# Patient Record
Sex: MALE | Race: WHITE | Employment: OTHER | URBAN - METROPOLITAN AREA
[De-identification: names, ages, dates, MRNs, and addresses within clinical notes are randomized per-mention and may not be internally consistent; named-entity substitution may affect disease eponyms.]

---

## 2017-04-21 PROBLEM — Z79.01 LONG TERM (CURRENT) USE OF ANTICOAGULANTS: Status: ACTIVE | Noted: 2017-04-21

## 2017-05-22 PROCEDURE — 86141 C-REACTIVE PROTEIN HS: CPT | Performed by: INTERNAL MEDICINE

## 2017-05-22 PROCEDURE — 82043 UR ALBUMIN QUANTITATIVE: CPT | Performed by: INTERNAL MEDICINE

## 2017-05-22 PROCEDURE — 81003 URINALYSIS AUTO W/O SCOPE: CPT | Performed by: INTERNAL MEDICINE

## 2017-05-22 PROCEDURE — 82570 ASSAY OF URINE CREATININE: CPT | Performed by: INTERNAL MEDICINE

## 2017-07-21 PROBLEM — E04.2 MULTIPLE THYROID NODULES: Status: ACTIVE | Noted: 2017-07-21

## 2017-08-25 ENCOUNTER — LAB ENCOUNTER (OUTPATIENT)
Dept: LAB | Age: 67
End: 2017-08-25
Attending: INTERNAL MEDICINE
Payer: MEDICARE

## 2017-08-25 DIAGNOSIS — E04.2 MULTIPLE THYROID NODULES: ICD-10-CM

## 2017-08-25 PROCEDURE — 88173 CYTOPATH EVAL FNA REPORT: CPT

## 2017-08-30 NOTE — PROGRESS NOTES
[phone] ([place])     [facility] regarding Dr. Magan Torres result note. Hours and number given.

## 2017-08-30 NOTE — PROGRESS NOTES
I would let him know that I received his FNA results. His L sided nodule was benign and R sided nodule was nondiagnostic. I feel comfortable continuing to monitor him with these results.   The R sided nodule which was nondiagnostic was previously biopsied

## 2018-05-03 VITALS — HEIGHT: 72 IN | BODY MASS INDEX: 27.77 KG/M2 | WEIGHT: 205 LBS

## 2018-05-04 NOTE — PAT NURSING NOTE
Labs from today reviewed with Dr. Jarvis Garcia; Plan is to have patient admitted night before for IV hydration;  Spoke with Sonia LI @ [facility] office who will coordinate with Dr. Jarvis Garcia and notify patient. Charge RN in cath lab notified.

## 2018-05-06 ENCOUNTER — HOSPITAL ENCOUNTER (OUTPATIENT)
Facility: HOSPITAL | Age: 68
Discharge: HOME | End: 2018-05-08
Attending: INTERNAL MEDICINE | Admitting: INTERNAL MEDICINE
Payer: MEDICARE

## 2018-05-06 PROBLEM — I25.10 CAD (CORONARY ARTERY DISEASE): Status: ACTIVE | Noted: 2018-05-06

## 2018-05-06 PROCEDURE — 85610 PROTHROMBIN TIME: CPT | Performed by: INTERNAL MEDICINE

## 2018-05-06 PROCEDURE — 83735 ASSAY OF MAGNESIUM: CPT | Performed by: HOSPITALIST

## 2018-05-06 PROCEDURE — 80048 BASIC METABOLIC PNL TOTAL CA: CPT | Performed by: HOSPITALIST

## 2018-05-06 PROCEDURE — 96372 THER/PROPH/DIAG INJ SC/IM: CPT

## 2018-05-06 PROCEDURE — 82962 GLUCOSE BLOOD TEST: CPT

## 2018-05-06 RX ORDER — SODIUM CHLORIDE 9 MG/ML
INJECTION, SOLUTION INTRAVENOUS CONTINUOUS
Status: DISCONTINUED | OUTPATIENT
Start: 2018-05-06 | End: 2018-05-08

## 2018-05-06 RX ORDER — ONDANSETRON 2 MG/ML
4 INJECTION INTRAMUSCULAR; INTRAVENOUS EVERY 6 HOURS PRN
Status: DISCONTINUED | OUTPATIENT
Start: 2018-05-06 | End: 2018-05-08

## 2018-05-06 RX ORDER — SODIUM PHOSPHATE, DIBASIC AND SODIUM PHOSPHATE, MONOBASIC 7; 19 G/133ML; G/133ML
1 ENEMA RECTAL ONCE AS NEEDED
Status: DISCONTINUED | OUTPATIENT
Start: 2018-05-06 | End: 2018-05-08

## 2018-05-06 RX ORDER — SODIUM POLYSTYRENE SULFONATE 15 G/60ML
15 SUSPENSION ORAL; RECTAL ONCE
Status: COMPLETED | OUTPATIENT
Start: 2018-05-06 | End: 2018-05-06

## 2018-05-06 RX ORDER — DEXTROSE MONOHYDRATE 25 G/50ML
50 INJECTION, SOLUTION INTRAVENOUS
Status: DISCONTINUED | OUTPATIENT
Start: 2018-05-06 | End: 2018-05-08

## 2018-05-06 RX ORDER — METOCLOPRAMIDE HYDROCHLORIDE 5 MG/ML
10 INJECTION INTRAMUSCULAR; INTRAVENOUS EVERY 8 HOURS PRN
Status: DISCONTINUED | OUTPATIENT
Start: 2018-05-06 | End: 2018-05-08

## 2018-05-06 RX ORDER — RISPERIDONE 0.25 MG/1
0.5 TABLET, FILM COATED ORAL DAILY
Status: DISCONTINUED | OUTPATIENT
Start: 2018-05-07 | End: 2018-05-08

## 2018-05-06 RX ORDER — BISACODYL 10 MG
10 SUPPOSITORY, RECTAL RECTAL
Status: DISCONTINUED | OUTPATIENT
Start: 2018-05-06 | End: 2018-05-08

## 2018-05-06 RX ORDER — ATORVASTATIN CALCIUM 20 MG/1
20 TABLET, FILM COATED ORAL NIGHTLY
Status: DISCONTINUED | OUTPATIENT
Start: 2018-05-06 | End: 2018-05-08

## 2018-05-06 RX ORDER — ACETAMINOPHEN 325 MG/1
650 TABLET ORAL EVERY 6 HOURS PRN
Status: DISCONTINUED | OUTPATIENT
Start: 2018-05-06 | End: 2018-05-08

## 2018-05-06 RX ORDER — ESCITALOPRAM OXALATE 20 MG/1
20 TABLET ORAL DAILY
Status: DISCONTINUED | OUTPATIENT
Start: 2018-05-06 | End: 2018-05-08

## 2018-05-06 RX ORDER — SODIUM CHLORIDE 9 MG/ML
INJECTION, SOLUTION INTRAVENOUS CONTINUOUS
Status: DISCONTINUED | OUTPATIENT
Start: 2018-05-06 | End: 2018-05-07

## 2018-05-06 RX ORDER — CLONAZEPAM 1 MG/1
1 TABLET ORAL NIGHTLY
Status: DISCONTINUED | OUTPATIENT
Start: 2018-05-06 | End: 2018-05-08

## 2018-05-06 RX ORDER — POLYETHYLENE GLYCOL 3350 17 G/17G
17 POWDER, FOR SOLUTION ORAL DAILY PRN
Status: DISCONTINUED | OUTPATIENT
Start: 2018-05-06 | End: 2018-05-08

## 2018-05-06 RX ORDER — RISPERIDONE 0.25 MG/1
0.25 TABLET, FILM COATED ORAL EVERY EVENING
Status: DISCONTINUED | OUTPATIENT
Start: 2018-05-06 | End: 2018-05-08

## 2018-05-07 ENCOUNTER — HOSPITAL ENCOUNTER (OUTPATIENT)
Dept: INTERVENTIONAL RADIOLOGY/VASCULAR | Facility: HOSPITAL | Age: 68
Discharge: HOME | End: 2018-05-07
Attending: INTERNAL MEDICINE
Payer: MEDICARE

## 2018-05-07 ENCOUNTER — APPOINTMENT (OUTPATIENT)
Dept: INTERVENTIONAL RADIOLOGY/VASCULAR | Facility: HOSPITAL | Age: 68
End: 2018-05-07
Attending: INTERNAL MEDICINE
Payer: MEDICARE

## 2018-05-07 PROCEDURE — 93005 ELECTROCARDIOGRAM TRACING: CPT

## 2018-05-07 PROCEDURE — 93455 CORONARY ART/GRFT ANGIO S&I: CPT

## 2018-05-07 PROCEDURE — 027035Z DILATION OF CORONARY ARTERY, ONE ARTERY WITH TWO DRUG-ELUTING INTRALUMINAL DEVICES, PERCUTANEOUS APPROACH: ICD-10-PCS | Performed by: INTERNAL MEDICINE

## 2018-05-07 PROCEDURE — 82962 GLUCOSE BLOOD TEST: CPT

## 2018-05-07 PROCEDURE — 96372 THER/PROPH/DIAG INJ SC/IM: CPT

## 2018-05-07 PROCEDURE — 80048 BASIC METABOLIC PNL TOTAL CA: CPT | Performed by: HOSPITALIST

## 2018-05-07 PROCEDURE — 85025 COMPLETE CBC W/AUTO DIFF WBC: CPT | Performed by: HOSPITALIST

## 2018-05-07 PROCEDURE — B211YZZ FLUOROSCOPY OF MULTIPLE CORONARY ARTERIES USING OTHER CONTRAST: ICD-10-PCS | Performed by: INTERNAL MEDICINE

## 2018-05-07 PROCEDURE — 85610 PROTHROMBIN TIME: CPT | Performed by: INTERNAL MEDICINE

## 2018-05-07 PROCEDURE — 83735 ASSAY OF MAGNESIUM: CPT | Performed by: HOSPITALIST

## 2018-05-07 PROCEDURE — B213YZZ FLUOROSCOPY OF MULTIPLE CORONARY ARTERY BYPASS GRAFTS USING OTHER CONTRAST: ICD-10-PCS | Performed by: INTERNAL MEDICINE

## 2018-05-07 PROCEDURE — 85018 HEMOGLOBIN: CPT | Performed by: NURSE PRACTITIONER

## 2018-05-07 PROCEDURE — 93010 ELECTROCARDIOGRAM REPORT: CPT | Performed by: INTERNAL MEDICINE

## 2018-05-07 PROCEDURE — B218YZZ FLUOROSCOPY OF LEFT INTERNAL MAMMARY BYPASS GRAFT USING OTHER CONTRAST: ICD-10-PCS | Performed by: INTERNAL MEDICINE

## 2018-05-07 RX ORDER — LABETALOL HYDROCHLORIDE 5 MG/ML
INJECTION, SOLUTION INTRAVENOUS
Status: COMPLETED
Start: 2018-05-07 | End: 2018-05-07

## 2018-05-07 RX ORDER — CLOPIDOGREL BISULFATE 75 MG/1
TABLET ORAL
Status: COMPLETED
Start: 2018-05-07 | End: 2018-05-07

## 2018-05-07 RX ORDER — HEPARIN SODIUM 5000 [USP'U]/ML
INJECTION, SOLUTION INTRAVENOUS; SUBCUTANEOUS
Status: COMPLETED
Start: 2018-05-07 | End: 2018-05-07

## 2018-05-07 RX ORDER — ACYCLOVIR 400 MG/1
800 TABLET ORAL
Status: DISCONTINUED | OUTPATIENT
Start: 2018-05-07 | End: 2018-05-08

## 2018-05-07 RX ORDER — SODIUM CHLORIDE 9 MG/ML
INJECTION, SOLUTION INTRAVENOUS CONTINUOUS
Status: ACTIVE | OUTPATIENT
Start: 2018-05-07 | End: 2018-05-07

## 2018-05-07 RX ORDER — CLOPIDOGREL BISULFATE 75 MG/1
75 TABLET ORAL DAILY
Status: DISCONTINUED | OUTPATIENT
Start: 2018-05-08 | End: 2018-05-08

## 2018-05-07 RX ORDER — ASPIRIN 81 MG/1
81 TABLET, CHEWABLE ORAL DAILY
Status: DISCONTINUED | OUTPATIENT
Start: 2018-05-07 | End: 2018-05-08

## 2018-05-07 RX ORDER — MIDAZOLAM HYDROCHLORIDE 1 MG/ML
INJECTION INTRAMUSCULAR; INTRAVENOUS
Status: DISCONTINUED
Start: 2018-05-07 | End: 2018-05-07 | Stop reason: WASHOUT

## 2018-05-07 RX ORDER — CLOPIDOGREL BISULFATE 75 MG/1
75 TABLET ORAL DAILY
Status: DISCONTINUED | OUTPATIENT
Start: 2018-05-08 | End: 2018-05-07

## 2018-05-07 RX ORDER — LIDOCAINE HYDROCHLORIDE 10 MG/ML
INJECTION, SOLUTION EPIDURAL; INFILTRATION; INTRACAUDAL; PERINEURAL
Status: COMPLETED
Start: 2018-05-07 | End: 2018-05-07

## 2018-05-07 NOTE — H&P
[facility] Hospitalist H&P       CC: direct admission for IVF prior to angiogram    PCP: Valentino Coons, MD    History of Present Illness: Pt is a 78 yo with mmp including but not limited to cad s/p cabg, s/p bioprosthetic aortic valve replacement, HTN/ klonopin  Gabapentin              OTHER (SEE COMMENTS)    Comment:weakness  Haldol [Haloperidol]    JITTERY    Comment:Made movements worse  Adhesive Tape           RASH  Latex                   RASH     Home Medications:    Outpatient Prescriptions Heart Disorder Father    • Cancer Mother      pancreatic cancer   • Heart Disorder Brother        Review of Systems  Comprehensive ROS reviewed and negative except for what's stated above. Including negative for chest pain, shortness of breath, syncope. prior to angiogram  -NPO at midnight    **CAD, HTN/HL  -coumadin on hold    **DMII-  -half dose daily insulin for tomorrow given NPO status  -iss, accuchecks    CKD-stable, monitor    **hyperkalemia- arb d/c'ed per cards.  Kayexalate x1    **cerebrovascular

## 2018-05-07 NOTE — PROCEDURES
[facility]    PATIENT'S NAME: Mp Finnegan   ATTENDING PHYSICIAN: Pepper Lawler. Marco Vizcarra M.D. OPERATING PHYSICIAN: Sherri Baeza M.D.    PATIENT ACCOUNT#:   [de-identified]    LOCATION:  [facility] 3 EDWP 10  MEDICAL RECORD #:   [id number]       D

## 2018-05-07 NOTE — PROGRESS NOTES
Pt with s/p cardiac cath with intervention. Right fem cath. Having post procedure complaint right flank pain. Site soft, no increased tenderness noted with palpation. No hematoma or firmness noted. Right fem cath site soft no hematoma. BP stable.  H/H ord

## 2018-05-07 NOTE — DIETARY NOTE
Nutrition Short Note    Dietitian consult received per cardiac rehab/CHF protocol. Pt to be educated by cardiac rehab staff and encouraged to attend outpatient classes taught by TERRY. TERRY available PRN.     Bonnie Brower RD, LDN  Pager #[phone]

## 2018-05-07 NOTE — PLAN OF CARE
Diabetes/Glucose Control    • Glucose maintained within prescribed range Progressing        Patient/Family Goals    • Patient/Family Long Term Goal Progressing    • Patient/Family Short Term Goal Progressing          Neuro: AOx4    Resp: Clear. Room air.  I

## 2018-05-07 NOTE — PROGRESS NOTES
[facility] Hospitalist Progress Note     PCP: Mp Rivera MD    CC:  Follow up    SUBJECTIVE:  Pt laying in bed. s/p PCI today with Casimiro placed to prox and midRCA. No complaints.  No cp/sob/n/v/f/c    OBJECTIVE:  Temp:  [97.8 °F (36.6 °C)-99.5 °F (37.5 °C)] 1 mg Oral Nightly   • escitalopram  20 mg Oral Daily   • risperiDONE  0.25 mg Oral QPM   • insulin detemir  17 Units Subcutaneous Daily   • Insulin Aspart Pen  2-10 Units Subcutaneous TID CC and HS   • risperiDONE  0.5 mg Oral Daily     • sodium chloride

## 2018-05-07 NOTE — PROCEDURES
[facility] Cardiology      Procedure:   GRANT MID AND PROX RCA, ANGIOMAX,                          PERCLOSE RFA          RESULTS: PTCA/STENT PROX, MID RCA, 95% TO 0% WITH 3 X 24 AND 3.5 X 24 MM SYNERGY DE STENTS, SLIGHTLY OVERLAPPING, EXPANDED TO 3.5 MM AND 4.5 M

## 2018-05-07 NOTE — PROCEDURES
Procedure:  [facility]  CHUCHO  SVG angio  LIMA Angio    Findings:  1. Occluded proximal LAD  2.  30% prox RI  3.  99% mid RCA stenosis followed by 70% stenosis  4. Patent SVG -Diag  5. Patent LIMA to LAD  6.   Diffuse disease in native LAD beyond anastomosis of

## 2018-05-07 NOTE — CONSULTS
[facility] Cardiology  Report of Consultation    Stef Trujillo Patient Status:  Observation    1950 MRN [id number]   [facility] 2NE-A Attending Wesley Ramos MD   Hosp Day # 0 PCP Harrison Salinas MD     Reason for KNEE REPLACEMENT SURGERY      Comment: right  2008: KNEE REPLACEMENT SURGERY      Comment: left  No date: OTHER SURGICAL HISTORY      Comment: right shoulder aa/lysis of adhesions  11/27/12: OTHER SURGICAL HISTORY      Comment: left  gisella. debride rct and l Inject 35 Units into the skin nightly.  (Patient taking differently: Inject 35 Units into the skin every morning.  ) Disp: 13.5 mL Rfl: 1   MetFORMIN HCl  MG Oral Tablet 24 Hr Take 1 tablet twice daily Disp: 180 tablet Rfl: 1   allopurinol 300 MG Oral male.  Pt is in no acute distress. HEENT:   Normocephalic. Atraumatic. Eyes with no scleral icterus. Neck: Supple. No JVD. Carotids 2+ and equal in symmetric fashion. No bruits are noted. Cardiac: Regular rate and rhythm.    There is a normal S1 and appearing LV function. 3.  Hypertension - controlled. BP significantly improved with weight reduction. 4.  Hyperlipidemia. 5.  Diabetes mellitus. 6.  Questionable family history of premature vascular disease.     7.  Transient post-op AFib - no documen

## 2018-05-07 NOTE — PLAN OF CARE
Diabetes/Glucose Control    • Glucose maintained within prescribed range Progressing        Patient/Family Goals    • Patient/Family Long Term Goal Progressing    • Patient/Family Short Term Goal Progressing          Assumed care of patient at 1.  Alert

## 2018-05-08 VITALS
OXYGEN SATURATION: 100 % | DIASTOLIC BLOOD PRESSURE: 66 MMHG | RESPIRATION RATE: 18 BRPM | TEMPERATURE: 98 F | HEART RATE: 73 BPM | SYSTOLIC BLOOD PRESSURE: 161 MMHG

## 2018-05-08 PROCEDURE — 85025 COMPLETE CBC W/AUTO DIFF WBC: CPT | Performed by: INTERNAL MEDICINE

## 2018-05-08 PROCEDURE — 83735 ASSAY OF MAGNESIUM: CPT | Performed by: HOSPITALIST

## 2018-05-08 PROCEDURE — 80048 BASIC METABOLIC PNL TOTAL CA: CPT | Performed by: INTERNAL MEDICINE

## 2018-05-08 PROCEDURE — 99211 OFF/OP EST MAY X REQ PHY/QHP: CPT

## 2018-05-08 PROCEDURE — 82962 GLUCOSE BLOOD TEST: CPT

## 2018-05-08 RX ORDER — CLOPIDOGREL BISULFATE 75 MG/1
75 TABLET ORAL DAILY
Qty: 90 TABLET | Refills: 3 | Status: SHIPPED | OUTPATIENT
Start: 2018-05-09 | End: 2019-04-02

## 2018-05-08 NOTE — PROGRESS NOTES
[facility] Cardiology  Progress Note    Harle Danger Patient Status:  Outpatient in a Bed    1950 MRN [id number]   [facility] 2NE-A Attending Toney Peri Day # 0 PCP MD Padilla Morgan Comment:Intolerance, made movements worse.  Tolerates             klonopin  Gabapentin              OTHER (SEE COMMENTS)    Comment:weakness  Haldol [Haloperidol]    JITTERY    Comment:Made movements worse  Adhesive Tape           RASH  Latex 05/06/18 2023 05/07/18   0550   PTP  15.4*  14.9*   INR  1.17*  1.12*       No results for input(s): TROP, CK in the last 72 hours. Tele: SR    Assessment:    1.  Exertional angina   -S/P GRANT to RCA 5/7/18  2.   S/P Bioprosthetic Aortic valve replac

## 2018-05-08 NOTE — PLAN OF CARE
Diabetes/Glucose Control    • Glucose maintained within prescribed range Adequate for Discharge        Patient/Family Goals    • Patient/Family Long Term Goal Adequate for Discharge    • Patient/Family Short Term Goal Adequate for Discharge            Pt a

## 2018-05-08 NOTE — PROGRESS NOTES
[facility] Hospitalist Progress Note     PCP: Lisbet Hartley MD    CC:  Follow up    SUBJECTIVE:  Pt laying in bed. No complaints. No cp/sob/n/v/f/c.  Hopes to go home today    OBJECTIVE:  Temp:  [98 °F (36.7 °C)-98.4 °F (36.9 °C)] 98.2 °F (36.8 °C)  Pul Daily   • acyclovir  800 mg Oral 5 x daily   • atorvastatin  20 mg Oral Nightly   • ClonazePAM  1 mg Oral Nightly   • escitalopram  20 mg Oral Daily   • risperiDONE  0.25 mg Oral QPM   • insulin detemir  17 Units Subcutaneous Daily   • Insulin Aspart Pen

## 2018-05-08 NOTE — PLAN OF CARE
Alert. Oriented. sr per tele. Rt groin soft, no hematoma noted. Denies pain. Voided. poc discussed with patient. Cont. to monitor pt.

## 2018-05-08 NOTE — DISCHARGE SUMMARY
General Medicine Discharge Summary     Patient ID:  Princess Chino  79year old  [id number]  8/23/1950    Admit date: 5/6/2018    Discharge date and time: 5/8/2018  4:47 PM     Attending Physician: Niels Spurling MD    Primary Care Physician: Emilia CALVERT 5/3/2018  DATE OF SERVICE: 05.03.2018 EXAMINATION: Chest radiograph 2 views CLINICAL INFORMATION: Essential (primary) hypertension. COMPARISON STUDY: September 28, 2017 FINDINGS: Cardiac silhouette and mediastinal contours are within normal limits.  [facility] Historical    clonazepam (KLONOPIN) 1 MG Oral Tab  Take 1 mg by mouth nightly.  , Historical      STOP taking these medications    insulin detemir 100 UNIT/ML Subcutaneous Solution Pen-injector    Warfarin Sodium 7.5 MG Oral Tab          Home Medication Ch

## 2018-05-08 NOTE — PLAN OF CARE
NURSING DISCHARGE NOTE    Discharged home via private  Accompanied by wife  Belongings taken with      Pt discharged to home. Discharge instructions reviewed with pt and wife, verbalized understand. IV catheter removed intact.  Pt tolerated well

## 2018-05-11 NOTE — PROCEDURES
[facility]    PATIENT'S NAME: Michael Irving   ATTENDING PHYSICIAN: Colt Infante. Luly Dee MD   OPERATING PHYSICIAN: Kody Soler M.D.    PATIENT ACCOUNT#:   [de-identified]    LOCATION:  [address] [place] [address]  MEDICAL RECORD #:   [id number]       DATE OF BIRTH: disengaged from the ostium of the right coronary artery and engaged a saphenous venous graft to the diagonal artery distribution. This graft is patent throughout its course.   There is mild diffuse luminal narrowing throughout the entire native diagonal ar of Dr. Robert Hightower.      Dictated By Walt Castillo M.D.  d: 05/10/2018 01:10:56  t: 05/10/2018 13:50:14  [facility] [id number]  /

## 2018-06-01 PROBLEM — Z79.01 LONG TERM (CURRENT) USE OF ANTICOAGULANTS: Status: RESOLVED | Noted: 2017-04-21 | Resolved: 2018-05-17

## 2018-07-24 PROCEDURE — 82607 VITAMIN B-12: CPT | Performed by: INTERNAL MEDICINE

## 2018-10-16 PROCEDURE — 82570 ASSAY OF URINE CREATININE: CPT | Performed by: PHYSICIAN ASSISTANT

## 2018-10-16 PROCEDURE — 82043 UR ALBUMIN QUANTITATIVE: CPT | Performed by: PHYSICIAN ASSISTANT

## 2019-03-21 RX ORDER — RISPERIDONE 0.25 MG/1
0.25 TABLET, FILM COATED ORAL NIGHTLY
COMMUNITY
End: 2019-03-22

## 2019-03-22 VITALS — BODY MASS INDEX: 27.09 KG/M2 | WEIGHT: 200 LBS | HEIGHT: 72 IN

## 2019-03-26 ENCOUNTER — HOSPITAL ENCOUNTER (OUTPATIENT)
Dept: INTERVENTIONAL RADIOLOGY/VASCULAR | Facility: HOSPITAL | Age: 69
Discharge: HOME | End: 2019-03-26
Attending: INTERNAL MEDICINE | Admitting: INTERNAL MEDICINE
Payer: MEDICARE

## 2019-03-26 DIAGNOSIS — Z95.818 STATUS POST PLACEMENT OF IMPLANTABLE LOOP RECORDER: ICD-10-CM

## 2019-03-26 PROCEDURE — 33286 RMVL SUBQ CAR RHYTHM MNTR: CPT

## 2019-03-26 PROCEDURE — 0JPT02Z REMOVAL OF MONITORING DEVICE FROM TRUNK SUBCUTANEOUS TISSUE AND FASCIA, OPEN APPROACH: ICD-10-PCS | Performed by: INTERNAL MEDICINE

## 2019-03-26 RX ORDER — LIDOCAINE HYDROCHLORIDE AND EPINEPHRINE 10; 10 MG/ML; UG/ML
10 INJECTION, SOLUTION INFILTRATION; PERINEURAL ONCE
Status: COMPLETED | OUTPATIENT
Start: 2019-03-26 | End: 2019-03-26

## 2019-03-26 RX ADMIN — LIDOCAINE HYDROCHLORIDE AND EPINEPHRINE 10 ML: 10; 10 INJECTION, SOLUTION INFILTRATION; PERINEURAL at 09:45:00

## 2019-03-26 NOTE — PROGRESS NOTES
linq reveal removed. manual pressure held per MD. Narvaez Lilly placed per dr Ramos. Site wnl  1135: discharge  Instructions reviewed with spouse. no questions. pt discharged to home ambulatory

## 2019-04-08 NOTE — PROCEDURES
Loop Recorder Removal      History:  76year old male with a cryptogenic stroke s/p LINQ now at [facility] who presents for its removal. The risks and benefits of the procedure (including, but not limited to, hematoma and infection) were discussed.  The patient un

## 2019-04-08 NOTE — H&P
Pre-op Diagnosis: * No pre-op diagnosis entered *    The above referenced H&P was reviewed by Keith Cuadra MD on 4/8/2019, the patient was examined and no significant changes have occurred in the patient's condition since the H&P was performed.   I d

## 2020-07-21 PROBLEM — E87.20 METABOLIC ACIDOSIS: Status: ACTIVE | Noted: 2020-07-21

## 2020-07-21 PROBLEM — E87.2 METABOLIC ACIDOSIS: Status: ACTIVE | Noted: 2020-07-21

## 2020-07-21 PROBLEM — E11.21 DIABETIC NEPHROPATHY ASSOCIATED WITH TYPE 2 DIABETES MELLITUS (HCC): Status: ACTIVE | Noted: 2020-07-21

## 2020-07-21 PROBLEM — N20.0 KIDNEY STONES: Status: ACTIVE | Noted: 2020-07-21

## 2020-10-13 ENCOUNTER — APPOINTMENT (OUTPATIENT)
Dept: MRI IMAGING | Facility: HOSPITAL | Age: 70
End: 2020-10-13
Attending: EMERGENCY MEDICINE
Payer: MEDICARE

## 2020-10-13 ENCOUNTER — HOSPITAL ENCOUNTER (OUTPATIENT)
Facility: HOSPITAL | Age: 70
Setting detail: OBSERVATION
Discharge: HOME | End: 2020-10-15
Attending: EMERGENCY MEDICINE | Admitting: INTERNAL MEDICINE
Payer: MEDICARE

## 2020-10-13 ENCOUNTER — APPOINTMENT (OUTPATIENT)
Dept: CT IMAGING | Facility: HOSPITAL | Age: 70
End: 2020-10-13
Attending: EMERGENCY MEDICINE
Payer: MEDICARE

## 2020-10-13 DIAGNOSIS — R41.0 ACUTE CONFUSION: Primary | ICD-10-CM

## 2020-10-13 PROBLEM — R41.3 AMNESIA MEMORY LOSS: Status: ACTIVE | Noted: 2020-10-13

## 2020-10-13 PROCEDURE — 96372 THER/PROPH/DIAG INJ SC/IM: CPT

## 2020-10-13 PROCEDURE — 85025 COMPLETE CBC W/AUTO DIFF WBC: CPT | Performed by: EMERGENCY MEDICINE

## 2020-10-13 PROCEDURE — 70450 CT HEAD/BRAIN W/O DYE: CPT | Performed by: EMERGENCY MEDICINE

## 2020-10-13 PROCEDURE — 80053 COMPREHEN METABOLIC PANEL: CPT | Performed by: EMERGENCY MEDICINE

## 2020-10-13 PROCEDURE — 99285 EMERGENCY DEPT VISIT HI MDM: CPT

## 2020-10-13 PROCEDURE — 96374 THER/PROPH/DIAG INJ IV PUSH: CPT

## 2020-10-13 PROCEDURE — 80053 COMPREHEN METABOLIC PANEL: CPT

## 2020-10-13 PROCEDURE — 81003 URINALYSIS AUTO W/O SCOPE: CPT | Performed by: EMERGENCY MEDICINE

## 2020-10-13 PROCEDURE — 82962 GLUCOSE BLOOD TEST: CPT

## 2020-10-13 PROCEDURE — 93005 ELECTROCARDIOGRAM TRACING: CPT

## 2020-10-13 PROCEDURE — 85025 COMPLETE CBC W/AUTO DIFF WBC: CPT

## 2020-10-13 PROCEDURE — 70551 MRI BRAIN STEM W/O DYE: CPT | Performed by: EMERGENCY MEDICINE

## 2020-10-13 PROCEDURE — 93010 ELECTROCARDIOGRAM REPORT: CPT

## 2020-10-13 PROCEDURE — 36415 COLL VENOUS BLD VENIPUNCTURE: CPT

## 2020-10-13 RX ORDER — LOSARTAN POTASSIUM 25 MG/1
12.5 TABLET ORAL DAILY
Status: DISCONTINUED | OUTPATIENT
Start: 2020-10-14 | End: 2020-10-15

## 2020-10-13 RX ORDER — CLOPIDOGREL BISULFATE 75 MG/1
75 TABLET ORAL
Status: DISCONTINUED | OUTPATIENT
Start: 2020-10-14 | End: 2020-10-15

## 2020-10-13 RX ORDER — ALBUTEROL SULFATE 2.5 MG/3ML
2.5 SOLUTION RESPIRATORY (INHALATION) EVERY 6 HOURS PRN
Status: DISCONTINUED | OUTPATIENT
Start: 2020-10-13 | End: 2020-10-15

## 2020-10-13 RX ORDER — ALBUTEROL SULFATE 90 UG/1
2 AEROSOL, METERED RESPIRATORY (INHALATION) EVERY 4 HOURS PRN
Status: DISCONTINUED | OUTPATIENT
Start: 2020-10-13 | End: 2020-10-15

## 2020-10-13 RX ORDER — ALLOPURINOL 300 MG/1
300 TABLET ORAL DAILY
Status: DISCONTINUED | OUTPATIENT
Start: 2020-10-14 | End: 2020-10-15

## 2020-10-13 RX ORDER — SODIUM BICARBONATE 325 MG/1
650 TABLET ORAL 2 TIMES DAILY
Status: DISCONTINUED | OUTPATIENT
Start: 2020-10-13 | End: 2020-10-15

## 2020-10-13 RX ORDER — ONDANSETRON 2 MG/ML
4 INJECTION INTRAMUSCULAR; INTRAVENOUS EVERY 6 HOURS PRN
Status: DISCONTINUED | OUTPATIENT
Start: 2020-10-13 | End: 2020-10-15

## 2020-10-13 RX ORDER — RISPERIDONE 0.25 MG/1
0.5 TABLET, FILM COATED ORAL 2 TIMES DAILY
Status: DISCONTINUED | OUTPATIENT
Start: 2020-10-13 | End: 2020-10-15

## 2020-10-13 RX ORDER — HEPARIN SODIUM 5000 [USP'U]/ML
5000 INJECTION, SOLUTION INTRAVENOUS; SUBCUTANEOUS EVERY 8 HOURS SCHEDULED
Status: DISCONTINUED | OUTPATIENT
Start: 2020-10-13 | End: 2020-10-15

## 2020-10-13 RX ORDER — METOCLOPRAMIDE HYDROCHLORIDE 5 MG/ML
5 INJECTION INTRAMUSCULAR; INTRAVENOUS EVERY 8 HOURS PRN
Status: DISCONTINUED | OUTPATIENT
Start: 2020-10-13 | End: 2020-10-15

## 2020-10-13 RX ORDER — LABETALOL HYDROCHLORIDE 5 MG/ML
10 INJECTION, SOLUTION INTRAVENOUS EVERY 6 HOURS PRN
Status: DISCONTINUED | OUTPATIENT
Start: 2020-10-13 | End: 2020-10-15

## 2020-10-13 RX ORDER — ESCITALOPRAM OXALATE 20 MG/1
20 TABLET ORAL DAILY
Status: DISCONTINUED | OUTPATIENT
Start: 2020-10-14 | End: 2020-10-15

## 2020-10-13 RX ORDER — DEXTROSE MONOHYDRATE 25 G/50ML
50 INJECTION, SOLUTION INTRAVENOUS
Status: DISCONTINUED | OUTPATIENT
Start: 2020-10-13 | End: 2020-10-15

## 2020-10-13 RX ORDER — ACETAMINOPHEN 325 MG/1
650 TABLET ORAL EVERY 6 HOURS PRN
Status: DISCONTINUED | OUTPATIENT
Start: 2020-10-13 | End: 2020-10-15

## 2020-10-13 RX ORDER — CLONAZEPAM 0.5 MG/1
1 TABLET ORAL NIGHTLY
Status: DISCONTINUED | OUTPATIENT
Start: 2020-10-13 | End: 2020-10-15

## 2020-10-13 RX ORDER — CETIRIZINE HYDROCHLORIDE 10 MG/1
10 TABLET ORAL
Status: DISCONTINUED | OUTPATIENT
Start: 2020-10-13 | End: 2020-10-15

## 2020-10-13 RX ORDER — ASPIRIN 81 MG/1
81 TABLET, CHEWABLE ORAL DAILY
Status: DISCONTINUED | OUTPATIENT
Start: 2020-10-14 | End: 2020-10-15

## 2020-10-13 RX ORDER — ATORVASTATIN CALCIUM 20 MG/1
20 TABLET, FILM COATED ORAL NIGHTLY
Status: DISCONTINUED | OUTPATIENT
Start: 2020-10-13 | End: 2020-10-15

## 2020-10-13 RX ORDER — MELATONIN
3 NIGHTLY
Status: DISCONTINUED | OUTPATIENT
Start: 2020-10-13 | End: 2020-10-15

## 2020-10-13 NOTE — ED INITIAL ASSESSMENT (HPI)
Pt to ED with family with concerns for episode of memory loss. Per wife pt drove self to store around 1pm, when she returned home at 3pm pt was still gone. She went to store to meet pt and feels he was confused and repetitive with his tasks.  Pt reports he

## 2020-10-14 ENCOUNTER — APPOINTMENT (OUTPATIENT)
Dept: CT IMAGING | Facility: HOSPITAL | Age: 70
End: 2020-10-14
Attending: Other
Payer: MEDICARE

## 2020-10-14 PROCEDURE — 95816 EEG AWAKE AND DROWSY: CPT

## 2020-10-14 PROCEDURE — 96372 THER/PROPH/DIAG INJ SC/IM: CPT

## 2020-10-14 PROCEDURE — 70498 CT ANGIOGRAPHY NECK: CPT | Performed by: OTHER

## 2020-10-14 PROCEDURE — 82962 GLUCOSE BLOOD TEST: CPT

## 2020-10-14 PROCEDURE — 80048 BASIC METABOLIC PNL TOTAL CA: CPT | Performed by: INTERNAL MEDICINE

## 2020-10-14 PROCEDURE — 70496 CT ANGIOGRAPHY HEAD: CPT | Performed by: OTHER

## 2020-10-14 PROCEDURE — 84443 ASSAY THYROID STIM HORMONE: CPT | Performed by: INTERNAL MEDICINE

## 2020-10-14 PROCEDURE — 80061 LIPID PANEL: CPT | Performed by: INTERNAL MEDICINE

## 2020-10-14 NOTE — PLAN OF CARE
Received patient a/Ox4, CANDELARIA, NSR on tele at 0700  Neuro q shift, no deficits   EEG completed, see results  CTA completed, results pending   Denying pain, reporting tingling to bilateral feet  Wife at bedside during the day     Problem: PAIN - ADULT  Goal: V minimize risk of hemorrhage  - Monitor temperature, glucose, and sodium.  Initiate appropriate interventions as ordered  Outcome: Progressing     Problem: NEUROLOGICAL - ADULT  Goal: Achieves stable or improved neurological status  Description: INTERVENTION

## 2020-10-14 NOTE — ED PROVIDER NOTES
Patient Seen in: [facility] Emergency Department      History   Patient presents with:  Altered Mental Status    Stated Complaint: Confused ~ wife reports patient was in grocery store for three hours and does n*    HPI    Patient is a 25-year-old ma 12/15/2011 - MARIA ELENA Moore    hemorrhoids, repeat 2021.    • COLONOSCOPY  12-15-11   • COLONOSCOPY, POSSIBLE BIOPSY, POSSIBLE POLYPECTOMY 88803 N/A 12/15/2011    Performed by Trell Villalta MD at [address] [place] [address]    right difficulty. No neglect. No gaze  No visual field deficit. Strength is 5 out of 5 both upper extremities he does have some movement issues which are chronic in the left upper extremity.   There is no lower extremity drift  Sensation is normal in the lo Report. Rate: 66  Rhythm: Sinus Rhythm  Reading: Sinus rhythm without acute ST changes. CBC BMP reviewed. Blood work is at his baseline. Urine is clean.     I have personally visualized the Radiology studies and agree with interpretation fro Patient here with acute short-term memory issues. Discussed with Dr. Troy Ruelas, [facility] neurology on-call. Agrees with MRI and admission. Will see the patient consultation.     Patient will be admitted primarily to the [facility] hospitalist.    Care has been dis

## 2020-10-14 NOTE — CONSULTS
Neurology consult NOTE    The reason for consultation:    Transient altered awareness.      HPI:   Mr. Babita Pichardo is a 80 yo male with PMH of 2 V CABG (2011) and bioprosthetic AVR, CAD s/p PCI (2018), hx of prior CVA, hx of hemiballism, HTN, HLD, DM type II, d rct and labrum with manip   • VALVE REPLACEMENT  1/25/11    23mm Cresencio-Clark Magna Ease Bovine      Family History   Problem Relation Age of Onset   • Heart Disorder Father    • Cancer Mother         pancreatic cancer   • Heart Disorder Brother RASH  Latex                   RASH  No current facility-administered medications on file prior to encounter.      •  Insulin Lispro, 1 Unit Dial, (HUMALOG KWIKPEN) 100 UNIT/ML Subcutaneous Solution Pen-injector, USE UP TO 30 UNITS THREE TIMES DAILY WITH CLARISSE Pen-injector, Inject 38 Units into the skin nightly., Disp: 13.5 mL, Rfl: 1    •  aspirin 81 MG Oral Tab, Take 81 mg by mouth daily. , Disp: , Rfl:     •  risperiDONE (RISPERDAL) 0.5 MG Oral Tab, Take 0.5 mg by mouth 2 (two) times daily.   , Disp: , Rfl: CONSTITUTIONAL/CV     Appearance: well nourished, well developed, no acute distress. MENTAL STATUS:     Orientation: Orientated to person, place and time.      Memory: Intacted       CRANIAL NERVES:     Visual fields /VA : There was no VF deficit on Cerebral atrophy with small vessel changes. Areas of encephalomalacia within the right parietal and left frontal lobe which are new since prior examination but likely represent chronic findings.   If clinical concern for acute infarct is high, consider MRI

## 2020-10-14 NOTE — H&P
[facility] Hospitalist H&P       CC: Patient presents with:  Altered Mental Status       PCP: Himanshu Guerin MD    History of Present Illness:  Mr. Shahid Saldaña is a 80 yo male with PMH of 2 V CABG (2011) and bioprosthetic AVR, CAD s/p PCI (2018), hx of prior CV 11/27/12    left sh aa. debride rct and labrum with manip   • VALVE REPLACEMENT  1/25/11    23mm Cresencio-Clark Magna Ease Bovine         ALL:    Ativan [Lorazepam]      JITTERY    Comment:Intolerance, made movements worse.  Tolerates             klonopi strip, Rfl: 3    •  Clopidogrel Bisulfate 75 MG Oral Tab, Take 1 tablet (75 mg total) by mouth once daily. , Disp: 90 tablet, Rfl: 3    •  Insulin Lispro (HUMALOG KWIKPEN) 100 UNIT/ML Subcutaneous Solution Pen-injector, USE UP TO 30 UNITS THREE TIMES DAILY anicteric, oral mucosa moist  Pulm: Lungs clear bilaterally, good inspiratory effort   CV:  nL S1/S2, RRR  Abd: soft, NT/ND, no hepatomegaly, +BS  MSK: moving all extremities, no edema  Neuro: moving all extremities, CN intact  Skin: no rashes/lesions  Psy evidence of depressed skull fracture. CONCLUSION: Cerebral atrophy with small vessel changes. Areas of encephalomalacia within the right parietal and left frontal lobe which are new since prior examination but likely represent chronic findings. (CST): Dez Yo MD on 10/13/2020 at 10:06 PM              ASSESSMENT / PLAN:      Mr. Babita Pichardo is a 80 yo male with PMH of 2 V CABG (2011) and bioprosthetic AVR, CAD s/p PCI (2018), hx of prior CVA, hx of hemiballism, HTN, HLD, DM type II, depressio

## 2020-10-15 VITALS
OXYGEN SATURATION: 96 % | DIASTOLIC BLOOD PRESSURE: 71 MMHG | HEIGHT: 72.84 IN | HEART RATE: 67 BPM | RESPIRATION RATE: 15 BRPM | SYSTOLIC BLOOD PRESSURE: 139 MMHG | WEIGHT: 210 LBS | TEMPERATURE: 98 F | BODY MASS INDEX: 27.83 KG/M2

## 2020-10-15 PROCEDURE — 82962 GLUCOSE BLOOD TEST: CPT

## 2020-10-15 PROCEDURE — 96372 THER/PROPH/DIAG INJ SC/IM: CPT

## 2020-10-15 NOTE — PLAN OF CARE
Pt discharged per wc per transport. No c/o and in no apparent distress. Reviewed in detail with wfie and pt the AVS. Pt is not to drive for 6 months and both understand and state will follow.

## 2020-10-15 NOTE — PLAN OF CARE
A/o x4, NSR on tele. WNL on RA. Denies any pain or SOB at this time. Baseline tingling to feet. Plan of care discussed with pt, verbalized understanding.  Call light within reach       Problem: PAIN - ADULT  Goal: Verbalizes/displays adequate comfort level temperature, glucose, and sodium.  Initiate appropriate interventions as ordered  Outcome: Progressing  Goal: Absence of seizures  Description: INTERVENTIONS  - Monitor for seizure activity  - Administer anti-seizure medications as ordered  - Monitor neurol

## 2020-10-15 NOTE — PLAN OF CARE
Assumed care of pt at [address] with wife at the bedside. Both voicing disappointment that the pt is not going home tonight. Neuro check done and intact, pt is alert and oriented. No periods of forgetfulness noted today per wife. Iv sl. sr on tele.  Sat 94% on r

## 2020-10-15 NOTE — PROCEDURES
[address]      PATIENT'S NAME: Abdulkadir Pineda   ATTENDING PHYSICIAN: Marisa Renteria MD   PATIENT ACCOUNT #: [de-identified] LOCATION: [address] [place] [address]   MEDICAL RECORD #: [id number] DATE OF BIRTH:

## 2020-10-15 NOTE — CONSULTS
Geo Solorzano MD.   [facility]  Vascular and Endovascular Surgery     VASCULAR SURGERY   CONSULT NOTE      Name: Stef Trujillo   :   1950  [id number]     10/15/2020       REFERRING PHYSICIAN: Henry Bonner DO  PRIMARY CARE PHYSI CABG X 2 with LIMA to LAD, SVG to diag   • CABG     • COLONOSCOPY  12/15/2011 - MARIA ELENA Moore    hemorrhoids, repeat 2021.    • COLONOSCOPY  12-15-11   • COLONOSCOPY, POSSIBLE BIOPSY, POSSIBLE POLYPECTOMY 50147 N/A 12/15/2011    Performed by Tre Wilson MD at •  cetirizine (ZYRTEC) tab 10 mg, 10 mg, Oral, Daily PRN  •  clonazePAM (KLONOPIN) tab 1 mg, 1 mg, Oral, Nightly  •  Clopidogrel Bisulfate (PLAVIX) tab 75 mg, 75 mg, Oral, Daily  •  melatonin tab 3 mg, 3 mg, Oral, Nightly  •  risperiDONE (RISPERDAL) tab 0. Recent Labs   Lab 10/13/20  1809   WBC 10.9   HGB 12.4*   MCV 94.4   .0       Recent Labs   Lab 10/13/20  1809 10/14/20  0618    141   K 4.6 4.4    109   CO2 24.0 28.0   BUN 40* 40*   CREATSERUM 2.03* 1.86*   * 109*   CA 8.7 8.3* PROCEDURE:  CT BRAIN OR HEAD (47737)  COMPARISON:  None. INDICATIONS:  Confused ~ wife reports patient was in grocery store for three hours and does not remember the event.  Wife also reports patient's unsteadiness was also worsenin  TECHNIQUE:  Noncontras PROCEDURE:  MRI BRAIN (CPT=70551)  COMPARISON:  None. INDICATIONS:  Confused ~ wife reports patient was in grocery store for three hours and does not remember the event.  Wife also reports patient's unsteadiness was also worsenin  TECHNIQUE:  MRI of the br PROCEDURE:  CTA BRAIN+CTA CAROTIDS (CONTRAST ONLY) (CPT=70496/34786)  COMPARISON:  [facility] , CT, CT BRAIN OR HEAD (35780), 10/13/2020, 7:21 PM.  INDICATIONS:  Confusion.   TECHNIQUE:  CT angiography of the head and neck were obtained with non-ionic contrast. cerebral arteries are patent. Mild atherosclerotic irregularity is noted within the anterior middle cerebral arteries. The branches of the posterior cerebral and superior  cerebellar arteries are patent.   There is mild to moderate atherosclerotic irreg CONCLUSION:  1. No acute intracranial hemorrhage. 2. There are small subacute or chronic cortical infarcts seen within the anterior left frontal lobe, posterior left frontal lobe, and right parietal lobe. These are similar in extent since 10/13/2020.   The The patient indicated an understanding of these issues and agreed to the plan and all questions were answered. Thank you for allowing me to participate in the patient's care.      Sincerely,  Cyrus Likes MD  10/15/20   1:56 PM

## 2020-10-15 NOTE — PROGRESS NOTES
[facility] Hospitalist Team  Progress Note      Heydi Arroyo  8/23/1950    Assessment/Plan:         Mr. Alessandro Still is a 80 yo male with PMH of 2 V CABG (2011) and bioprosthetic AVR, CAD s/p PCI (2018), hx of prior CVA, hx of hemiballism, HTN, HLD, DM type II, dep rate and rhythm, no murmur  Abd: Abdomen soft, nontender, nondistended, bowel sounds present  MSK: No edema, no clubbing, no cyanosis  Skin: no rashes or lesions      Data:       Labs:   Recent Labs   Lab 10/13/20  1809   WBC 10.9   HGB 12.4*   MCV 94.4

## 2020-10-15 NOTE — DISCHARGE SUMMARY
General Medicine Discharge Summary     Patient ID:  Mehnaz Bledsoe  79year old  8/23/1950    Admit date: 10/13/2020    Discharge date and time:10/15/20    Attending Physician: Suma Diamond DO type II c/b neuropathy and nephropathy  - continue insulin -- levemir 25 units nightly  - SSC     # CKD stage III  - follows with nephrology  - on low dose ARB given albuminuria  - also on sodium bicarb  - avoid nephrotoxic agents  - Cr at baseline     # M 100 UNIT/ML Subcutaneous Solution Pen-injector  USE UP TO 30 UNITS THREE TIMES DAILY WITH MEALS    insulin glargine (TOUJEO SOLOSTAR) 300 UNIT/ML Subcutaneous Solution Pen-injector  Inject 38 Units into the skin nightly.     aspirin 81 MG Oral Tab  Take 81

## 2020-10-16 NOTE — PROGRESS NOTES
Neurology follow up NOTE    SUBJECTIVE:  No overnight events. Have talked to his wife at bed side today. Pt dose has intermittent confusion, but she contributed to his multi- meds and depression.  She also reported later that pt was dehydrated at the [facility]    SPEECH:     Articulation: NL     Rhythm: NL     REFLEXES:     RUE: 2+/4     RLE: absent      LUE: 2+/4     LLE: absent     Ct Brain Or Head (59642)    Result Date: 10/13/2020  CONCLUSION: Cerebral atrophy with small vessel changes.   Areas of encephalo nodule. Thyroid ultrasound is recommended for further assessment. 6. Possible dental caries. Clinical correlation with dental exam is recommended.     Dictated by (CST): Claudette Phillips MD on 10/14/2020 at 7:30 PM     Finalized by (CST): Claudette Phillips

## 2020-11-14 ENCOUNTER — APPOINTMENT (OUTPATIENT)
Dept: LAB | Age: 70
DRG: 036 | End: 2020-11-14
Attending: SURGERY
Payer: MEDICARE

## 2020-11-14 ENCOUNTER — LAB ENCOUNTER (OUTPATIENT)
Dept: LAB | Age: 70
DRG: 036 | End: 2020-11-14
Attending: SURGERY
Payer: MEDICARE

## 2020-11-14 DIAGNOSIS — I77.9 BILATERAL CAROTID ARTERY DISEASE (HCC): ICD-10-CM

## 2020-11-14 DIAGNOSIS — Z01.818 PREOP TESTING: ICD-10-CM

## 2020-11-14 DIAGNOSIS — Z91.89 AT RISK FOR BLEEDING: ICD-10-CM

## 2020-11-14 PROCEDURE — 85025 COMPLETE CBC W/AUTO DIFF WBC: CPT

## 2020-11-14 PROCEDURE — 86900 BLOOD TYPING SEROLOGIC ABO: CPT

## 2020-11-14 PROCEDURE — 86850 RBC ANTIBODY SCREEN: CPT

## 2020-11-14 PROCEDURE — 86901 BLOOD TYPING SEROLOGIC RH(D): CPT

## 2020-11-14 PROCEDURE — 36415 COLL VENOUS BLD VENIPUNCTURE: CPT

## 2020-11-14 PROCEDURE — 80048 BASIC METABOLIC PNL TOTAL CA: CPT

## 2020-11-14 PROCEDURE — 85730 THROMBOPLASTIN TIME PARTIAL: CPT

## 2020-11-14 PROCEDURE — 85610 PROTHROMBIN TIME: CPT

## 2020-11-15 ENCOUNTER — ANESTHESIA EVENT (OUTPATIENT)
Dept: CARDIAC SURGERY | Facility: HOSPITAL | Age: 70
DRG: 036 | End: 2020-11-15
Payer: MEDICARE

## 2020-11-16 ENCOUNTER — HOSPITAL ENCOUNTER (INPATIENT)
Facility: HOSPITAL | Age: 70
LOS: 2 days | Discharge: HOME | DRG: 036 | End: 2020-11-18
Attending: SURGERY | Admitting: SURGERY
Payer: MEDICARE

## 2020-11-16 ENCOUNTER — ANESTHESIA (OUTPATIENT)
Dept: CARDIAC SURGERY | Facility: HOSPITAL | Age: 70
DRG: 036 | End: 2020-11-16
Payer: MEDICARE

## 2020-11-16 ENCOUNTER — APPOINTMENT (OUTPATIENT)
Dept: GENERAL RADIOLOGY | Facility: HOSPITAL | Age: 70
DRG: 036 | End: 2020-11-16
Attending: SURGERY
Payer: MEDICARE

## 2020-11-16 DIAGNOSIS — Z01.818 PREOP TESTING: ICD-10-CM

## 2020-11-16 DIAGNOSIS — Z91.89 AT RISK FOR BLEEDING: ICD-10-CM

## 2020-11-16 DIAGNOSIS — I77.9 BILATERAL CAROTID ARTERY DISEASE (HCC): Primary | ICD-10-CM

## 2020-11-16 PROCEDURE — 82803 BLOOD GASES ANY COMBINATION: CPT

## 2020-11-16 PROCEDURE — 82962 GLUCOSE BLOOD TEST: CPT

## 2020-11-16 PROCEDURE — B317YZZ FLUOROSCOPY OF LEFT INTERNAL CAROTID ARTERY USING OTHER CONTRAST: ICD-10-PCS | Performed by: SURGERY

## 2020-11-16 PROCEDURE — 84295 ASSAY OF SERUM SODIUM: CPT

## 2020-11-16 PROCEDURE — 86920 COMPATIBILITY TEST SPIN: CPT

## 2020-11-16 PROCEDURE — 82330 ASSAY OF CALCIUM: CPT

## 2020-11-16 PROCEDURE — 037L3DZ DILATION OF LEFT INTERNAL CAROTID ARTERY WITH INTRALUMINAL DEVICE, PERCUTANEOUS APPROACH: ICD-10-PCS | Performed by: SURGERY

## 2020-11-16 PROCEDURE — 84132 ASSAY OF SERUM POTASSIUM: CPT

## 2020-11-16 PROCEDURE — 85347 COAGULATION TIME ACTIVATED: CPT

## 2020-11-16 PROCEDURE — 71045 X-RAY EXAM CHEST 1 VIEW: CPT | Performed by: SURGERY

## 2020-11-16 PROCEDURE — 85014 HEMATOCRIT: CPT

## 2020-11-16 DEVICE — 9 MM X 40 MM
Type: IMPLANTABLE DEVICE | Site: NECK | Status: FUNCTIONAL
Brand: ENROUTE TRANSCAROTID STENT

## 2020-11-16 RX ORDER — CEFAZOLIN SODIUM/WATER 2 G/20 ML
SYRINGE (ML) INTRAVENOUS AS NEEDED
Status: DISCONTINUED | OUTPATIENT
Start: 2020-11-16 | End: 2020-11-16 | Stop reason: SURG

## 2020-11-16 RX ORDER — LABETALOL HYDROCHLORIDE 5 MG/ML
5 INJECTION, SOLUTION INTRAVENOUS EVERY 5 MIN PRN
Status: ACTIVE | OUTPATIENT
Start: 2020-11-16 | End: 2020-11-17

## 2020-11-16 RX ORDER — MIDAZOLAM HYDROCHLORIDE 1 MG/ML
INJECTION INTRAMUSCULAR; INTRAVENOUS AS NEEDED
Status: DISCONTINUED | OUTPATIENT
Start: 2020-11-16 | End: 2020-11-16 | Stop reason: SURG

## 2020-11-16 RX ORDER — DEXTROSE AND SODIUM CHLORIDE 5; .45 G/100ML; G/100ML
INJECTION, SOLUTION INTRAVENOUS CONTINUOUS
Status: DISCONTINUED | OUTPATIENT
Start: 2020-11-16 | End: 2020-11-18

## 2020-11-16 RX ORDER — HYDROCODONE BITARTRATE AND ACETAMINOPHEN 5; 325 MG/1; MG/1
2 TABLET ORAL AS NEEDED
Status: COMPLETED | OUTPATIENT
Start: 2020-11-16 | End: 2020-11-16

## 2020-11-16 RX ORDER — ATORVASTATIN CALCIUM 20 MG/1
20 TABLET, FILM COATED ORAL NIGHTLY
Status: DISCONTINUED | OUTPATIENT
Start: 2020-11-16 | End: 2020-11-18

## 2020-11-16 RX ORDER — DEXMEDETOMIDINE HYDROCHLORIDE 4 UG/ML
INJECTION, SOLUTION INTRAVENOUS CONTINUOUS PRN
Status: DISCONTINUED | OUTPATIENT
Start: 2020-11-16 | End: 2020-11-16 | Stop reason: SURG

## 2020-11-16 RX ORDER — NALOXONE HYDROCHLORIDE 0.4 MG/ML
80 INJECTION, SOLUTION INTRAMUSCULAR; INTRAVENOUS; SUBCUTANEOUS AS NEEDED
Status: ACTIVE | OUTPATIENT
Start: 2020-11-16 | End: 2020-11-17

## 2020-11-16 RX ORDER — HYDROMORPHONE HYDROCHLORIDE 1 MG/ML
0.4 INJECTION, SOLUTION INTRAMUSCULAR; INTRAVENOUS; SUBCUTANEOUS EVERY 5 MIN PRN
Status: ACTIVE | OUTPATIENT
Start: 2020-11-16 | End: 2020-11-17

## 2020-11-16 RX ORDER — BUPIVACAINE HYDROCHLORIDE 5 MG/ML
INJECTION, SOLUTION EPIDURAL; INTRACAUDAL AS NEEDED
Status: DISCONTINUED | OUTPATIENT
Start: 2020-11-16 | End: 2020-11-16 | Stop reason: HOSPADM

## 2020-11-16 RX ORDER — ALBUTEROL SULFATE 90 UG/1
2 AEROSOL, METERED RESPIRATORY (INHALATION) EVERY 4 HOURS PRN
Status: DISCONTINUED | OUTPATIENT
Start: 2020-11-16 | End: 2020-11-18

## 2020-11-16 RX ORDER — ESCITALOPRAM OXALATE 20 MG/1
20 TABLET ORAL DAILY
Status: DISCONTINUED | OUTPATIENT
Start: 2020-11-17 | End: 2020-11-18

## 2020-11-16 RX ORDER — DEXTROSE MONOHYDRATE 25 G/50ML
50 INJECTION, SOLUTION INTRAVENOUS
Status: DISCONTINUED | OUTPATIENT
Start: 2020-11-16 | End: 2020-11-17

## 2020-11-16 RX ORDER — TRAMADOL HYDROCHLORIDE 50 MG/1
50 TABLET ORAL EVERY 6 HOURS PRN
Status: DISCONTINUED | OUTPATIENT
Start: 2020-11-16 | End: 2020-11-18

## 2020-11-16 RX ORDER — ONDANSETRON 2 MG/ML
4 INJECTION INTRAMUSCULAR; INTRAVENOUS AS NEEDED
Status: ACTIVE | OUTPATIENT
Start: 2020-11-16 | End: 2020-11-17

## 2020-11-16 RX ORDER — LIDOCAINE HYDROCHLORIDE 10 MG/ML
INJECTION, SOLUTION INFILTRATION; PERINEURAL AS NEEDED
Status: DISCONTINUED | OUTPATIENT
Start: 2020-11-16 | End: 2020-11-16 | Stop reason: HOSPADM

## 2020-11-16 RX ORDER — TRAMADOL HYDROCHLORIDE 50 MG/1
100 TABLET ORAL EVERY 6 HOURS PRN
Status: DISCONTINUED | OUTPATIENT
Start: 2020-11-16 | End: 2020-11-18

## 2020-11-16 RX ORDER — METOCLOPRAMIDE HYDROCHLORIDE 5 MG/ML
INJECTION INTRAMUSCULAR; INTRAVENOUS AS NEEDED
Status: DISCONTINUED | OUTPATIENT
Start: 2020-11-16 | End: 2020-11-16 | Stop reason: SURG

## 2020-11-16 RX ORDER — HYDROCODONE BITARTRATE AND ACETAMINOPHEN 5; 325 MG/1; MG/1
1 TABLET ORAL AS NEEDED
Status: COMPLETED | OUTPATIENT
Start: 2020-11-16 | End: 2020-11-16

## 2020-11-16 RX ORDER — ACETAMINOPHEN 325 MG/1
650 TABLET ORAL EVERY 4 HOURS PRN
Status: DISCONTINUED | OUTPATIENT
Start: 2020-11-16 | End: 2020-11-18

## 2020-11-16 RX ORDER — CEFAZOLIN SODIUM/WATER 2 G/20 ML
2 SYRINGE (ML) INTRAVENOUS EVERY 8 HOURS
Status: COMPLETED | OUTPATIENT
Start: 2020-11-16 | End: 2020-11-17

## 2020-11-16 RX ORDER — HEPARIN SODIUM 5000 [USP'U]/ML
5000 INJECTION, SOLUTION INTRAVENOUS; SUBCUTANEOUS EVERY 8 HOURS SCHEDULED
Status: DISCONTINUED | OUTPATIENT
Start: 2020-11-17 | End: 2020-11-18

## 2020-11-16 RX ORDER — ONDANSETRON 2 MG/ML
INJECTION INTRAMUSCULAR; INTRAVENOUS AS NEEDED
Status: DISCONTINUED | OUTPATIENT
Start: 2020-11-16 | End: 2020-11-16 | Stop reason: SURG

## 2020-11-16 RX ORDER — CLOPIDOGREL BISULFATE 75 MG/1
75 TABLET ORAL
Status: DISCONTINUED | OUTPATIENT
Start: 2020-11-17 | End: 2020-11-18

## 2020-11-16 RX ORDER — ONDANSETRON 2 MG/ML
4 INJECTION INTRAMUSCULAR; INTRAVENOUS EVERY 6 HOURS PRN
Status: DISCONTINUED | OUTPATIENT
Start: 2020-11-16 | End: 2020-11-18

## 2020-11-16 RX ORDER — ASPIRIN 81 MG/1
81 TABLET, CHEWABLE ORAL DAILY
Status: DISCONTINUED | OUTPATIENT
Start: 2020-11-17 | End: 2020-11-18

## 2020-11-16 RX ORDER — SODIUM CHLORIDE, SODIUM LACTATE, POTASSIUM CHLORIDE, CALCIUM CHLORIDE 600; 310; 30; 20 MG/100ML; MG/100ML; MG/100ML; MG/100ML
INJECTION, SOLUTION INTRAVENOUS CONTINUOUS
Status: DISCONTINUED | OUTPATIENT
Start: 2020-11-16 | End: 2020-11-18

## 2020-11-16 RX ORDER — SODIUM CHLORIDE 9 MG/ML
INJECTION, SOLUTION INTRAVENOUS CONTINUOUS PRN
Status: DISCONTINUED | OUTPATIENT
Start: 2020-11-16 | End: 2020-11-16 | Stop reason: SURG

## 2020-11-16 RX ORDER — GLYCOPYRROLATE 0.2 MG/ML
INJECTION, SOLUTION INTRAMUSCULAR; INTRAVENOUS AS NEEDED
Status: DISCONTINUED | OUTPATIENT
Start: 2020-11-16 | End: 2020-11-16 | Stop reason: SURG

## 2020-11-16 RX ORDER — PROTAMINE SULFATE 10 MG/ML
INJECTION, SOLUTION INTRAVENOUS AS NEEDED
Status: DISCONTINUED | OUTPATIENT
Start: 2020-11-16 | End: 2020-11-16 | Stop reason: SURG

## 2020-11-16 RX ORDER — HEPARIN SODIUM 1000 [USP'U]/ML
INJECTION, SOLUTION INTRAVENOUS; SUBCUTANEOUS AS NEEDED
Status: DISCONTINUED | OUTPATIENT
Start: 2020-11-16 | End: 2020-11-16 | Stop reason: SURG

## 2020-11-16 RX ADMIN — DEXMEDETOMIDINE HYDROCHLORIDE 0.4 MCG/KG/HR: 4 INJECTION, SOLUTION INTRAVENOUS at 16:20:00

## 2020-11-16 RX ADMIN — DEXMEDETOMIDINE HYDROCHLORIDE 0.6 MCG/KG/HR: 4 INJECTION, SOLUTION INTRAVENOUS at 16:40:00

## 2020-11-16 RX ADMIN — GLYCOPYRROLATE 0.2 MG: 0.2 INJECTION, SOLUTION INTRAMUSCULAR; INTRAVENOUS at 16:10:00

## 2020-11-16 RX ADMIN — DEXMEDETOMIDINE HYDROCHLORIDE 1 MCG/KG/HR: 4 INJECTION, SOLUTION INTRAVENOUS at 14:40:00

## 2020-11-16 RX ADMIN — ONDANSETRON 4 MG: 2 INJECTION INTRAMUSCULAR; INTRAVENOUS at 14:55:00

## 2020-11-16 RX ADMIN — SODIUM CHLORIDE: 9 INJECTION, SOLUTION INTRAVENOUS at 14:40:00

## 2020-11-16 RX ADMIN — CEFAZOLIN SODIUM/WATER 2 G: 2 G/20 ML SYRINGE (ML) INTRAVENOUS at 15:00:00

## 2020-11-16 RX ADMIN — METOCLOPRAMIDE HYDROCHLORIDE 10 MG: 5 INJECTION INTRAMUSCULAR; INTRAVENOUS at 14:55:00

## 2020-11-16 RX ADMIN — HEPARIN SODIUM 9000 UNITS: 1000 INJECTION, SOLUTION INTRAVENOUS; SUBCUTANEOUS at 16:00:00

## 2020-11-16 RX ADMIN — SODIUM CHLORIDE: 9 INJECTION, SOLUTION INTRAVENOUS at 17:31:00

## 2020-11-16 RX ADMIN — GLYCOPYRROLATE 0.2 MG: 0.2 INJECTION, SOLUTION INTRAMUSCULAR; INTRAVENOUS at 16:20:00

## 2020-11-16 RX ADMIN — DEXMEDETOMIDINE HYDROCHLORIDE 0.6 MCG/KG/HR: 4 INJECTION, SOLUTION INTRAVENOUS at 15:00:00

## 2020-11-16 RX ADMIN — MIDAZOLAM HYDROCHLORIDE 1 MG: 1 INJECTION INTRAMUSCULAR; INTRAVENOUS at 14:40:00

## 2020-11-16 RX ADMIN — DEXMEDETOMIDINE HYDROCHLORIDE 0.8 MCG/KG/HR: 4 INJECTION, SOLUTION INTRAVENOUS at 14:50:00

## 2020-11-16 RX ADMIN — PROTAMINE SULFATE 50 MG: 10 INJECTION, SOLUTION INTRAVENOUS at 16:40:00

## 2020-11-16 RX ADMIN — GLYCOPYRROLATE 0.2 MG: 0.2 INJECTION, SOLUTION INTRAMUSCULAR; INTRAVENOUS at 16:30:00

## 2020-11-16 NOTE — OPERATIVE REPORT
Mariluz [facility]  [id number]  8/23/1950  CSN: [id number]    Date of Procedure: 11/16/20       PREOPERATIVE DIAGNOSIS: Asymptomatic severe left ICA stenosis      POSTOPERATIVE DIAGNOSIS: Same    PROCEDURE PERFORMED:  1.  Ultrasound guided percutaneous access of The patient was subsequently prepped and draped in  the usual sterile fashion. A timeout was performed confirming the correct patient, procedure, site, laterality and antibiotic administration.      1% lidocaine was infiltrated above the clavicle between th system and flushed thereby initiating passive flow reversal. Prior to lesion manipulation, heart rate and systolic blood pressure were managed upwards to optimize flow reversal and procedural neuroprotection.  The previously placed silastic vessel loops wer

## 2020-11-16 NOTE — ANESTHESIA POSTPROCEDURE EVALUATION
[address] [facility] Patient Status:  Inpatient   Age/Gender 79year old male MRN [id number]   [facility] 1NE-A Attending Dominik Amor MD   Hosp Day # 0 PCP Reddy Blancas MD       Anesthesia Post-op Note    Procedure(s):

## 2020-11-16 NOTE — ANESTHESIA PROCEDURE NOTES
Peripheral IV  Date/Time: 11/16/2020 2:50 PM  Inserted by:  Devon Sun MD    Placement  Needle size: 18 G  Laterality: left  Location: hand  Local anesthetic: none  Site prep: alcohol  Technique: anatomical landmarks  Attempts: 1

## 2020-11-16 NOTE — ANESTHESIA PROCEDURE NOTES
Arterial Line  Performed by: Javier Camara MD  Authorized by:  Javier Camara MD     General Information and Staff    Procedure Start:  11/16/2020 2:43 PM  Procedure End:  11/16/2020 2:47 PM  Anesthesiologist:  Javier Camara MD  Performed By:  Naima Newton

## 2020-11-16 NOTE — ANESTHESIA PREPROCEDURE EVALUATION
PRE-OP EVALUATION    Patient Name: Bruno Diamond    Pre-op Diagnosis: bilateral carotid stenosis    Procedure(s):  left transcarotid artery revascularization  SA pending  LATEX ALLERGY    Surgeon(s) and Role:     Amanuel Suero MD - Primary    Pre-op problems/murmurs (s/p AVR (2011))                        Endo/Other      (+) diabetes and well controlled, type 2, using insulin                         Pulmonary                    (+) sleep apnea       Neuro/Psych  Comment: Recent hospitalization for acu >3 FB  TM distance: 4 - 6 cm  Neck ROM: full Cardiovascular    Cardiovascular exam normal.  Rhythm: regular  Rate: normal     Dental             Pulmonary    Pulmonary exam normal.  Breath sounds clear to auscultation bilaterally.                Other findi

## 2020-11-16 NOTE — H&P
Ko Abernathy MD.  [facility]  Vascular and Endovascular Surgery     VASCULAR SURGERY   PREOP H&P NOTE      Name: Michell Hernandez   :   1950  [id number]     2020       REFERRING PHYSICIAN: Avinash Palomino MD  PRIMARY CARE PHYSI Past Surgical History:   Procedure Laterality Date   • AORTIC HRT VALVE W/CARD BYP  2011   • BACK SURGERY      lumbar laminectomy 95   • BYPASS SURGERY  1/25/11    CABG X 2 with LIMA to LAD, SVG to diag   • CABG     • CATH PERCUTANEOUS  TRANSLUMINAL SOTO ABDOMEN: Soft, non-tender, non-distended  BACK: Normal, no tenderness  SKIN: No rashes, warm and dry  MUSCULOSKELETAL: Strength 5/5 throughout, sensation intact  EXTREMITIES: No edema  VASCULAR: 2+ palpable pulses bilaterally     Diagnostic Data:      LABS ---------------------------------------------------------------------------- Transthoracic Echocardiogram 2D Echo with Doppler Patient:     Edilberto Mccarthy Date:   10/29/2020 MRN:         [id number]                           BSA: ---------  Stroke volume, 1-p A4C                          93    ml       ---------  Stroke volume/bsa, 1-p A4C                      43    ml/m^2   ---------  LV end-diastolic volume, 2-p            (H)     166   ml       67 - 376  LV end-systolic volume, S                             46.8  cm       ---------  Aortic mean gradient, S                         11    mm Hg    ---------  Aortic peak gradient, S                         22    mm Hg    ---------  Aortic valve area, VTI                          1.34 0.57  m/sec    ---------  Mitral E/A ratio, peak                          1              ---------   Pulmonary arteries                              Value          Reference  PA pressure, S, DP                              27    mm Hg    ---------   Uche visualized. Mildly thickened leaflets. No echocardiographic evidence for prolapse. Doppler:  Transvalvular velocity is within the normal range. There is no evidence for stenosis. There is mild regurgitation. Tricuspid valve: Well visualized.   Structural 04/18/2018. - Compared to the prior study, these findings indicate worsening.  Prepared and signed by Marina Templeton MD 10/30/2020 23:17     Card Nuc Stress Test Lexiscan (phc=[id number])    Addendum Date: 10/23/2020 --------------------------------------------------------------------------- - Myocardial Perfusion Imaging Lexiscan (Report amended ) Patient:            Katie Swartz MRN:                [id number] Study Date:         10/23/2020  Location:           *[facility] recorded. Intravenous access was obtained. Pulse oximetric signals were monitored. Lexiscan stress test. Stress testing was performed, with Lexiscan by intravenous bolus, for a total dose of 400mcg, followed by a 5ml saline flush.  The infusion was termina --------------------------------------------------------------------------- - Study data:  Study limited due to motion artifact.   Study completion:  There were no complications. --------------------------------------------------------------------------- - ---------------------------------------------------------------------------- Myocardial Perfusion Imaging Lexiscan Patient:            Jackie Arias MRN:                [id number] Study Date:         10/23/2020  Location:           [facility]* : was obtained. Pulse oximetric signals were monitored. Lexiscan stress test. Stress testing was performed, with Lexiscan by intravenous bolus, for a total dose of 400mcg, followed by a 5ml saline flush.  The infusion was terminated due to maximal dose admin involving the basal and mid inferior wall(s), likely due to diaphragm   attenuation. - Gated SPECT: The ejection fraction is greater than 70%. Impressions: - Normal nuclear perfusion study. - There is no evidence of myocardial ischemia.  - There is no evide

## 2020-11-17 PROCEDURE — 85027 COMPLETE CBC AUTOMATED: CPT | Performed by: SURGERY

## 2020-11-17 PROCEDURE — 82962 GLUCOSE BLOOD TEST: CPT

## 2020-11-17 PROCEDURE — 80048 BASIC METABOLIC PNL TOTAL CA: CPT | Performed by: SURGERY

## 2020-11-17 RX ORDER — HYDROCODONE BITARTRATE AND ACETAMINOPHEN 5; 325 MG/1; MG/1
1-2 TABLET ORAL EVERY 4 HOURS PRN
Status: DISCONTINUED | OUTPATIENT
Start: 2020-11-17 | End: 2020-11-18

## 2020-11-17 RX ORDER — DEXTROSE MONOHYDRATE 25 G/50ML
50 INJECTION, SOLUTION INTRAVENOUS
Status: DISCONTINUED | OUTPATIENT
Start: 2020-11-17 | End: 2020-11-18

## 2020-11-17 NOTE — CONSULTS
General Medicine Consult      Reason for consult:  Post op medical management     Consulted by: Dr. Richards     PCP: Emmett Falcon MD      History of Present Illness: Patient is a 79year old male with PMH sig for HTN, HLD, DM II, CVA, and CAD s/p PCI pr shoulder aa/lysis of adhesions   • OTHER SURGICAL HISTORY  11/27/12    left sh aa. debride rct and labrum with manip   • VALVE REPLACEMENT  1/25/11    23mm Cresencio-Clark Magna Ease Bovine         Home Medications:    •  TOUJEO SOLOSTAR 300 UNIT/ML Subc Oral Tab, Take 20 mg by mouth daily. , Disp: , Rfl:     •  melatonin 3 MG Oral Tab, Take 3 mg by mouth nightly., Disp: , Rfl:     •  clonazepam (KLONOPIN) 1 MG Oral Tab, Take 1 mg by mouth nightly.  , Disp: , Rfl:         Scheduled Medication:  • Insulin As bilaterally, normal respiratory effort  CV: Heart with regular rate and rhythm, no murmur. Normal PMI.     Abd: Abdomen soft, nontender, nondistended, no organomegaly, bowel sounds present  MSK: Full range of motion in extremities, no clubbing, no cyanosis Measurements  Left ventricle                                  Value          Reference  LV ID, ED, PLAX                         (L)     4.1   cm       4.2 - 5.9  LV ID, ES, PLAX                                 3.0   cm       ---------  LV fx shortening, PL 2.2   cm       ---------  LVOT area                                       4     cm^2     ---------  LVOT ID                                         2.2   cm       ---------  LVOT peak velocity, S                           0.88  m/sec 3.3   cm       <4.3  Ascending aorta ID, A-P, ED                     3.4   cm       ---------   Left atrium                                     Value          Reference  LA ID, A-P, ES                          (H)     4.4   cm       3.0 - 4.0  LA ID/bsa, A thickness is mildly increased. Systolic function is normal. The estimated ejection fraction is 55-60%. Regional wall motion abnormalities cannot be excluded (possible apical hypokinesis?). Ventricular septum: Thickness is mildly increased.  Right ventricl is mildly increased. Systolic function is normal. The estimated    ejection fraction is 55-60%. Diastolic function indeterminate. Regional    wall motion abnormalities cannot be excluded (possible apical    hypokinesis?). 2. Ventricular septum:  Thickness i --------------------------------------------------------------------------- - History:   Risk factors:  Family history of coronary artery disease. Hypertension. Diabetes mellitus.  Dyslipidemia. -------------------------------------------------------------- patient was asymptomatic during the test. This is a negative stress EKG test for ST segment depression. SPECT Study:  While at rest the patient received 10. 7mCi Tc[99m]-sestamibi intravenously on 10/23/2020 Gamma camera imaging was performed aproximately 3 gated SPECT analysis. - Normal ejection fraction.  Gloria Bergeron MD, [facility] 10/23/2020 16:48     Result Date: 10/23/2020  ---------------------------------------------------------------------------- Myocardial Perfusion Imaging Lexiscan Patient: Procedure Narrative :  Initial setup. The patient was brought to the laboratory. A baseline ECG was recorded. Intravenous access was obtained. Pulse oximetric signals were monitored.   Lexiscan stress test. Stress testing was performed, with Lexiscan by int ---------------------------------------------------------------------------- Summary: - Myocardial perfusion imaging:  There is a small, mild, fixed defect   involving the basal and mid inferior wall(s), likely due to diaphragm   attenuation. - Gated SPECT: bolus given  · Monitor orthostatics     # Essential HTN  · Hold losartan for now given low BP readings     # HLD  · Cont statin    # DM II with hyperglycemia and nephropathy   - hold home long acting insulin for now given low GB readings  - ISS with accuch

## 2020-11-17 NOTE — PLAN OF CARE
Assumed care of pt at [address] [facility]. S/P L TCAR currently POD#1. A/O x4 Neuro assessment q4hr. Denies c/o H/A, visual disturbances or tingling/numbness. Neuros intact, has prior LUE weakness and ataxia. Pt and spouse state baseline. Medicated x2 for surgical pain.

## 2020-11-17 NOTE — PLAN OF CARE
Neuro intact. Pt out of bed to chair this morning. Initially feels fine. Eats 100% of breakfast. When asked if ready to walk, pt states he feels dizzy, SBP in 90's. Assisted back to bed, SBP back to 130's laying down.  Dr. Jim Cantor notified, Fluid bolus order

## 2020-11-17 NOTE — PLAN OF CARE
Pt received from the [place] [address] about 1720. Pt extubated on nasal canula. Radial arterial line in place. No urinary catheter. Left neck and right groin sites WNL. 1+ pedal pulse present. BP labile with SBP drop down to 60's.   Levophed restarted and quick

## 2020-11-17 NOTE — PROGRESS NOTES
Resting comfortably  Dressing removed  Patient had some orthostasis this morning and was dizzy, back in bed.  No changes in neuro exam  Will give a bolus  Will monitor for today

## 2020-11-18 VITALS
HEIGHT: 72 IN | SYSTOLIC BLOOD PRESSURE: 151 MMHG | BODY MASS INDEX: 27.8 KG/M2 | HEART RATE: 58 BPM | WEIGHT: 205.25 LBS | DIASTOLIC BLOOD PRESSURE: 63 MMHG | TEMPERATURE: 98 F | RESPIRATION RATE: 17 BRPM | OXYGEN SATURATION: 97 %

## 2020-11-18 PROCEDURE — 51701 INSERT BLADDER CATHETER: CPT

## 2020-11-18 PROCEDURE — 82962 GLUCOSE BLOOD TEST: CPT

## 2020-11-18 NOTE — PLAN OF CARE
Neuros stable, denies c/o dizziness. B/P w/I parameters. Manny during shift. TCAR surgical sites, L neck and R groin stable. Ambulated to bathroom this AM slightly unsteady, side to side motion. States walking is ok. Anxious to discharge. Cont monitored.

## 2020-11-18 NOTE — PLAN OF CARE
Received patient at 0730. Alert and Oriented x4. Tele Rhythm NSR. BP within goal range. O2 saturation WNL on room air. Breath sounds clear/diminished. Bed is locked and in low position. Call light and personal items within reach. No C/O chest pain or SOB.

## 2020-11-18 NOTE — PROGRESS NOTES
[facility] Hospitalist Progress Note                                                                   [address] [facility]  8/23/1950    SUBJECTIVE:  Pt seen and examined.   Doing well, states pain cont **OR** Glucose-Vitamin C **OR** dextrose **OR** glucose **OR** Glucose-Vitamin C, HYDROcodone-acetaminophen, Albuterol Sulfate HFA, ondansetron HCl, acetaminophen **OR** traMADol HCl **OR** traMADol HCl    Assessment/Plan:  Active Problems:    * No active

## 2020-11-27 NOTE — DISCHARGE SUMMARY
[facility]    Discharge Summary    Stevie [facility] Patient Status:  Inpatient    1950 MRN [id number]   [facility] 1NE-A Attending No att. providers found   Hosp Day # 2 PCP Dee Jimenez MD     Date of Admission: 2020 gums normal   Neck:   Supple, symmetrical, trachea midline, no adenopathy;        thyroid:  No enlargement/tenderness/nodules; no carotid    bruit or JVD   Back:     Symmetric, no curvature, ROM normal, no CVA tenderness   Lungs:     Clear to auscultation #2    Consultations: Hospitalist    Procedures: Left TCAR    Complications: None apparent    Discharge Condition: Good         Discharge Medications:      Discharge Medications      CHANGE how you take these medications      Instructions Prescription detai Quantity: 90 tablet  Refills: 3     ergocalciferol 1.25 MG (79432 UT) Caps  Commonly known as: DRISDOL/VITAMIN D2      TAKE 1 CAPSULE BY MOUTH ONE TIME PER WEEK   Quantity: 12 capsule  Refills: 1     escitalopram 20 MG Tabs  Commonly known as: Linda Morillo

## 2021-01-12 PROBLEM — N25.81 SECONDARY HYPERPARATHYROIDISM OF RENAL ORIGIN (HCC): Status: ACTIVE | Noted: 2021-01-12

## 2021-01-12 PROBLEM — I77.9 BILATERAL CAROTID ARTERY DISEASE, UNSPECIFIED TYPE (HCC): Status: ACTIVE | Noted: 2021-01-12

## 2021-05-27 ENCOUNTER — HOSPITAL ENCOUNTER (EMERGENCY)
Facility: HOSPITAL | Age: 71
Discharge: HOME | End: 2021-05-28
Attending: EMERGENCY MEDICINE
Payer: MEDICARE

## 2021-05-27 ENCOUNTER — APPOINTMENT (OUTPATIENT)
Dept: GENERAL RADIOLOGY | Facility: HOSPITAL | Age: 71
End: 2021-05-27
Attending: EMERGENCY MEDICINE
Payer: MEDICARE

## 2021-05-27 DIAGNOSIS — E16.2 HYPOGLYCEMIA: Primary | ICD-10-CM

## 2021-05-27 PROCEDURE — 80053 COMPREHEN METABOLIC PANEL: CPT | Performed by: EMERGENCY MEDICINE

## 2021-05-27 PROCEDURE — 93005 ELECTROCARDIOGRAM TRACING: CPT

## 2021-05-27 PROCEDURE — 36415 COLL VENOUS BLD VENIPUNCTURE: CPT

## 2021-05-27 PROCEDURE — 99284 EMERGENCY DEPT VISIT MOD MDM: CPT

## 2021-05-27 PROCEDURE — 84484 ASSAY OF TROPONIN QUANT: CPT | Performed by: EMERGENCY MEDICINE

## 2021-05-27 PROCEDURE — 71045 X-RAY EXAM CHEST 1 VIEW: CPT | Performed by: EMERGENCY MEDICINE

## 2021-05-27 PROCEDURE — 93010 ELECTROCARDIOGRAM REPORT: CPT

## 2021-05-27 PROCEDURE — 85025 COMPLETE CBC W/AUTO DIFF WBC: CPT | Performed by: EMERGENCY MEDICINE

## 2021-05-27 PROCEDURE — 82962 GLUCOSE BLOOD TEST: CPT

## 2021-05-28 VITALS
DIASTOLIC BLOOD PRESSURE: 68 MMHG | HEIGHT: 72 IN | TEMPERATURE: 98 F | SYSTOLIC BLOOD PRESSURE: 157 MMHG | WEIGHT: 210 LBS | HEART RATE: 73 BPM | BODY MASS INDEX: 28.44 KG/M2 | RESPIRATION RATE: 25 BRPM | OXYGEN SATURATION: 98 %

## 2021-05-28 PROCEDURE — 82962 GLUCOSE BLOOD TEST: CPT

## 2021-05-28 NOTE — ED QUICK NOTES
Patient starting to feel shaky. Liquid glucose given and patient given sandwich to eat. Will recheck sugar in 15 min.

## 2021-05-28 NOTE — ED INITIAL ASSESSMENT (HPI)
Family reports blood sugar running in the 600's \"twice in the last hour, c/o chest tightness, breathing heavy. He has a movement disorder and seems a lot more today. \" Pt denies n/v, denies dizziness/lightheadedness.  Pt given a Reg dose ASA PTA per family

## 2021-05-28 NOTE — ED PROVIDER NOTES
Patient Seen in: [facility] Emergency Department      History   Patient presents with:  Hyperglycemia    Stated Complaint: hyperglycemia    HPI/Subjective:   HPI    77-year-old male who presents to the ER complaining that he had chest tightness and palpation. Extremities: Generalized movement noted. No obvious deformities. Good strength 5 out of 5. No calf tenderness. No edema.        ED Course     Labs Reviewed   COMP METABOLIC PANEL (14) - Abnormal; Notable for the following components: RAINBOW DRAW LAVENDER   RAINBOW DRAW LIGHT GREEN   RAINBOW DRAW GOLD     EKG    Rate, intervals and axes as noted on EKG Report.   Rate: 75  Rhythm: Sinus Rhythm  Reading: Normal sinus rhythm nonspecific ST-T wave changes essentially unchanged from previo symptoms they will contact her endocrinologist tomorrow for follow-up. Discharge good condition.                        Disposition and Plan     Clinical Impression:  Hypoglycemia  (primary encounter diagnosis)     Disposition:  Discharge  5/28/2021 12:46

## 2021-09-14 ENCOUNTER — HOSPITAL ENCOUNTER (INPATIENT)
Facility: HOSPITAL | Age: 71
LOS: 2 days | Discharge: HOME | DRG: 247 | End: 2021-09-18
Attending: INTERNAL MEDICINE | Admitting: INTERNAL MEDICINE
Payer: MEDICARE

## 2021-09-14 LAB
ALBUMIN SERPL-MCNC: 3.2 G/DL (ref 3.4–5)
ANION GAP SERPL CALC-SCNC: 4 MMOL/L (ref 0–18)
BUN BLD-MCNC: 53 MG/DL (ref 7–18)
CALCIUM BLD-MCNC: 8.6 MG/DL (ref 8.5–10.1)
CHLORIDE SERPL-SCNC: 109 MMOL/L (ref 98–112)
CO2 SERPL-SCNC: 24 MMOL/L (ref 21–32)
CREAT BLD-MCNC: 2.18 MG/DL
GLUCOSE BLD-MCNC: 157 MG/DL (ref 70–99)
GLUCOSE BLD-MCNC: 243 MG/DL (ref 70–99)
GLUCOSE BLD-MCNC: 259 MG/DL (ref 70–99)
GLUCOSE BLD-MCNC: 278 MG/DL (ref 70–99)
GLUCOSE BLD-MCNC: 285 MG/DL (ref 70–99)
OSMOLALITY SERPL CALC.SUM OF ELEC: 308 MOSM/KG (ref 275–295)
PHOSPHATE SERPL-MCNC: 4.2 MG/DL (ref 2.5–4.9)
POTASSIUM SERPL-SCNC: 4.6 MMOL/L (ref 3.5–5.1)
SARS-COV-2 RNA RESP QL NAA+PROBE: NOT DETECTED
SODIUM SERPL-SCNC: 137 MMOL/L (ref 136–145)

## 2021-09-14 PROCEDURE — 82962 GLUCOSE BLOOD TEST: CPT

## 2021-09-14 PROCEDURE — 80069 RENAL FUNCTION PANEL: CPT | Performed by: INTERNAL MEDICINE

## 2021-09-14 PROCEDURE — 94660 CPAP INITIATION&MGMT: CPT

## 2021-09-14 RX ORDER — CLONAZEPAM 1 MG/1
1 TABLET ORAL NIGHTLY
Status: DISCONTINUED | OUTPATIENT
Start: 2021-09-14 | End: 2021-09-18

## 2021-09-14 RX ORDER — SODIUM CHLORIDE 9 MG/ML
INJECTION, SOLUTION INTRAVENOUS CONTINUOUS
Status: DISCONTINUED | OUTPATIENT
Start: 2021-09-14 | End: 2021-09-18

## 2021-09-14 RX ORDER — SODIUM BICARBONATE 325 MG/1
650 TABLET ORAL 2 TIMES DAILY
Status: DISCONTINUED | OUTPATIENT
Start: 2021-09-14 | End: 2021-09-18

## 2021-09-14 RX ORDER — AMLODIPINE BESYLATE 5 MG/1
5 TABLET ORAL DAILY
Status: DISCONTINUED | OUTPATIENT
Start: 2021-09-14 | End: 2021-09-18

## 2021-09-14 RX ORDER — MELATONIN
3 NIGHTLY
Status: DISCONTINUED | OUTPATIENT
Start: 2021-09-14 | End: 2021-09-18

## 2021-09-14 RX ORDER — DEXTROSE MONOHYDRATE 25 G/50ML
50 INJECTION, SOLUTION INTRAVENOUS
Status: DISCONTINUED | OUTPATIENT
Start: 2021-09-14 | End: 2021-09-18

## 2021-09-14 RX ORDER — HEPARIN SODIUM 5000 [USP'U]/ML
5000 INJECTION, SOLUTION INTRAVENOUS; SUBCUTANEOUS EVERY 8 HOURS SCHEDULED
Status: DISCONTINUED | OUTPATIENT
Start: 2021-09-14 | End: 2021-09-18

## 2021-09-14 RX ORDER — ALBUTEROL SULFATE 90 UG/1
2 AEROSOL, METERED RESPIRATORY (INHALATION) EVERY 4 HOURS PRN
Status: DISCONTINUED | OUTPATIENT
Start: 2021-09-14 | End: 2021-09-18

## 2021-09-14 RX ORDER — ESCITALOPRAM OXALATE 5 MG/1
5 TABLET ORAL NIGHTLY
Status: DISCONTINUED | OUTPATIENT
Start: 2021-09-14 | End: 2021-09-18

## 2021-09-14 RX ORDER — CLOPIDOGREL BISULFATE 75 MG/1
75 TABLET ORAL NIGHTLY
Status: DISCONTINUED | OUTPATIENT
Start: 2021-09-14 | End: 2021-09-18

## 2021-09-14 RX ORDER — ATORVASTATIN CALCIUM 40 MG/1
40 TABLET, FILM COATED ORAL NIGHTLY
Status: DISCONTINUED | OUTPATIENT
Start: 2021-09-14 | End: 2021-09-18

## 2021-09-14 RX ORDER — ALLOPURINOL 300 MG/1
300 TABLET ORAL DAILY
Status: DISCONTINUED | OUTPATIENT
Start: 2021-09-15 | End: 2021-09-18

## 2021-09-14 RX ORDER — LOSARTAN POTASSIUM 25 MG/1
12.5 TABLET ORAL DAILY
Status: DISCONTINUED | OUTPATIENT
Start: 2021-09-14 | End: 2021-09-18

## 2021-09-14 RX ORDER — SODIUM CHLORIDE 9 MG/ML
INJECTION, SOLUTION INTRAVENOUS CONTINUOUS
Status: DISCONTINUED | OUTPATIENT
Start: 2021-09-14 | End: 2021-09-14

## 2021-09-14 RX ORDER — RISPERIDONE 0.25 MG/1
0.5 TABLET, FILM COATED ORAL 2 TIMES DAILY
Status: DISCONTINUED | OUTPATIENT
Start: 2021-09-14 | End: 2021-09-18

## 2021-09-14 RX ORDER — CLOPIDOGREL BISULFATE 75 MG/1
75 TABLET ORAL DAILY
Status: DISCONTINUED | OUTPATIENT
Start: 2021-09-14 | End: 2021-09-14

## 2021-09-14 RX ORDER — ASPIRIN 81 MG/1
81 TABLET ORAL DAILY
Status: DISCONTINUED | OUTPATIENT
Start: 2021-09-14 | End: 2021-09-18

## 2021-09-14 NOTE — PLAN OF CARE
NURSING ADMISSION NOTE      Patient admitted via Wheelchair  Oriented to room. Safety precautions initiated. Bed in low position. Call light in reach. Received pt as DA around 1230  SBP 160s. [address] APN updated.  norvasc added  IVF initiated  Spo

## 2021-09-14 NOTE — CONSULTS
[facility]  Report of Consultation    Jessi Christensen Patient Status:  Observation    1950 MRN [id number]   [facility] 7NE-A Attending Linette Flores MD   Hosp Day # 0 PCP Shira Concepcion MD     Reason for Consultation: BYP  2011   • BACK SURGERY      lumbar laminectomy 95   • BYPASS SURGERY  1/25/11    CABG X 2 with LIMA to LAD, SVG to diag   • CABG     • CATH PERCUTANEOUS  TRANSLUMINAL CORONARY ANGIOPLASTY     • COLONOSCOPY  12/15/2011 - MARIA ELENA Moore    hemorrhoids, repeat 20 (98.9 kg)      Telemetry: NSR  General: Alert and oriented in no apparent distress. HEENT: No focal deficits. Neck: No JVD, carotids 2+ no bruits. Cardiac: Regular rate and rhythm, S1, S2 normal, 4/6 systolic murmur, no rub or gallop.   Lungs: Clear with evidence of myocardial ischemia. - There is no evidence of myocardial infarction.   - Normal regional wall thickening is noted on gated SPECT analysis. - Normal ejection fraction.       Cath 5/18:  FINAL IMPRESSION:    1.          PSUFSK left main coron tomorrow.      Aortic stenosis s/p bioprosthetic AVR 1/2011  - stable on echo as above    HTN  - well controlled    HLD  - on statin therapy    DM type 2    Transient post-op Afib  - no documented recurrence    VT in past  - normal LVEF and negative cath th

## 2021-09-14 NOTE — CONSULTS
[facility]    Report of Consultation    Ira Cardoso Patient Status:  Observation    1950 MRN [id number]   [facility] 7NE-A Attending Carlos Manley MD   Hosp Day # 0 PCP Ernestine Muller MD     Date of consult:  KNEE REPLACEMENT SURGERY  2007    right   • KNEE REPLACEMENT SURGERY  2008    left   • OTHER SURGICAL HISTORY      right shoulder aa/lysis of adhesions   • OTHER SURGICAL HISTORY  11/27/12    left sh aa. debride rct and labrum with manip   • VALVE REPLACEME 09/10/2021    BUN 48.0 (H) 09/10/2021    BUNCREA 25.0 (H) 09/10/2021    CREATSERUM 1.93 (H) 09/10/2021    ANIONGAP 8 05/27/2021    GFR 59 (L) 11/25/2015    GFRNAA 34 (L) 05/27/2021    GFRAA 39 (L) 05/27/2021    CA 9.6 09/10/2021    OSMOCALC 309 (H) 05/27/2 AVR 2011, CAD sp CABG and GRANT to RCX 5/2018, HTN, HL, DM2, CVA 2015, PFO, uric acid kidney stones, CKD3b w Cr 1.9-2.1mg/dL who present for planned C on 9/15/21.     CKD3B  -- due to diabetic nephropathy   -- baseline Cr 1.9-2.1mg/dL   -- check labs   -- p

## 2021-09-14 NOTE — H&P
General Medicine H&P     No chief complaint on file.        PCP: Bradly Lovett MD    History of Present Illness: Patient is a 70year old male with PMH including but not limited to severe aortic stenosis s/p bioprosthetic AVR 2011, CAD s/p CABG and GRANT and labrum with manip   • VALVE REPLACEMENT  1/25/11    23mm Cresencio-Clark Magna Ease Bovine         ALL:    Ativan [Lorazepam]      JITTERY    Comment:Intolerance, made movements worse.  Tolerates             klonopin  Gabapentin              OTHER (S XR CHEST PA + LAT CHEST (CPT=71046)    Result Date: 9/10/2021  DATE OF SERVICE: 09.10.2021 XR CHEST PA + LAT CHEST (CPT=71046)  -  9/10/2021 11:30 AM Indication:   Essential hypertension, benign Comparison: 12/14/2019 Technique: Chest, PA and lateral views 50% face to face encounter.   D/w pt/wife, RN María Aburto, HIEU Blakely MD  [facility] Hospitalist  Pager: [phone]  9/14/2021  3:53 PM

## 2021-09-15 ENCOUNTER — APPOINTMENT (OUTPATIENT)
Dept: INTERVENTIONAL RADIOLOGY/VASCULAR | Facility: HOSPITAL | Age: 71
DRG: 247 | End: 2021-09-15
Attending: NURSE PRACTITIONER
Payer: MEDICARE

## 2021-09-15 LAB
ALBUMIN SERPL-MCNC: 3 G/DL (ref 3.4–5)
ANION GAP SERPL CALC-SCNC: 5 MMOL/L (ref 0–18)
BUN BLD-MCNC: 48 MG/DL (ref 7–18)
CALCIUM BLD-MCNC: 8.6 MG/DL (ref 8.5–10.1)
CHLORIDE SERPL-SCNC: 113 MMOL/L (ref 98–112)
CO2 SERPL-SCNC: 22 MMOL/L (ref 21–32)
CREAT BLD-MCNC: 1.98 MG/DL
GLUCOSE BLD-MCNC: 159 MG/DL (ref 70–99)
GLUCOSE BLD-MCNC: 173 MG/DL (ref 70–99)
GLUCOSE BLD-MCNC: 181 MG/DL (ref 70–99)
GLUCOSE BLD-MCNC: 194 MG/DL (ref 70–99)
GLUCOSE BLD-MCNC: 300 MG/DL (ref 70–99)
MAGNESIUM SERPL-MCNC: 2 MG/DL (ref 1.6–2.6)
OSMOLALITY SERPL CALC.SUM OF ELEC: 307 MOSM/KG (ref 275–295)
PHOSPHATE SERPL-MCNC: 4.2 MG/DL (ref 2.5–4.9)
POTASSIUM SERPL-SCNC: 4.6 MMOL/L (ref 3.5–5.1)
SODIUM SERPL-SCNC: 140 MMOL/L (ref 136–145)

## 2021-09-15 PROCEDURE — B2121ZZ FLUOROSCOPY OF SINGLE CORONARY ARTERY BYPASS GRAFT USING LOW OSMOLAR CONTRAST: ICD-10-PCS | Performed by: INTERNAL MEDICINE

## 2021-09-15 PROCEDURE — 82962 GLUCOSE BLOOD TEST: CPT

## 2021-09-15 PROCEDURE — B2111ZZ FLUOROSCOPY OF MULTIPLE CORONARY ARTERIES USING LOW OSMOLAR CONTRAST: ICD-10-PCS | Performed by: INTERNAL MEDICINE

## 2021-09-15 PROCEDURE — 80069 RENAL FUNCTION PANEL: CPT | Performed by: INTERNAL MEDICINE

## 2021-09-15 PROCEDURE — B2181ZZ FLUOROSCOPY OF LEFT INTERNAL MAMMARY BYPASS GRAFT USING LOW OSMOLAR CONTRAST: ICD-10-PCS | Performed by: INTERNAL MEDICINE

## 2021-09-15 PROCEDURE — 4A023N7 MEASUREMENT OF CARDIAC SAMPLING AND PRESSURE, LEFT HEART, PERCUTANEOUS APPROACH: ICD-10-PCS | Performed by: INTERNAL MEDICINE

## 2021-09-15 PROCEDURE — 93455 CORONARY ART/GRFT ANGIO S&I: CPT

## 2021-09-15 PROCEDURE — 83735 ASSAY OF MAGNESIUM: CPT | Performed by: INTERNAL MEDICINE

## 2021-09-15 RX ORDER — HEPARIN SODIUM 5000 [USP'U]/ML
INJECTION, SOLUTION INTRAVENOUS; SUBCUTANEOUS
Status: COMPLETED
Start: 2021-09-15 | End: 2021-09-15

## 2021-09-15 RX ORDER — LIDOCAINE HYDROCHLORIDE 10 MG/ML
INJECTION, SOLUTION EPIDURAL; INFILTRATION; INTRACAUDAL; PERINEURAL
Status: COMPLETED
Start: 2021-09-15 | End: 2021-09-15

## 2021-09-15 RX ORDER — MIDAZOLAM HYDROCHLORIDE 1 MG/ML
INJECTION INTRAMUSCULAR; INTRAVENOUS
Status: COMPLETED
Start: 2021-09-15 | End: 2021-09-15

## 2021-09-15 NOTE — PROGRESS NOTES
[facility] Cardiology  Progress Note    Kellenne Novalexx Patient Status:  Observation    1950 MRN [id number]   [facility] 7NE-A Attending Philomena Wright MD   Hosp Day # 0 PCP Tavares Connor MD     Subjective:   No c movements worse.  Tolerates             klonopin  Gabapentin              OTHER (SEE COMMENTS)    Comment:weakness  Haldol [Haloperidol]    JITTERY    Comment:Made movements worse  Adhesive Tape           RASH  Latex                   RASH    Physical Exam: Summary:     1. Left ventricle: The cavity size is at the upper limits of normal. Wall      thickness is mildly increased. Systolic function is normal. The estimated      ejection fraction is 55-60%. Diastolic function indeterminate.  Regional      wall apical HK. 9.  25/45 CVA - embolic appearance. ALVA without focal source. 10.Depression. 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 PFO on ALVA  13. VT on past LINQ interrogation with NL EF and negative cath thereafter.   14.  Stress Cardiolite with abn

## 2021-09-15 NOTE — PLAN OF CARE
Assumed care at 0700. Pt A/O x4. RA. NSR on tele. VSS. Groin sites prepped for cath lab and consent signed. Angio completed. 1500: Back from cath lab. R groin site is c/d/i and soft, no evidence of a hematoma. Pedal pulse palpable.  Per cath lab RN, onl

## 2021-09-15 NOTE — PROGRESS NOTES
[facility]    Nephrology Progress Note    Tiffany Wick Attending:  Rob Mcgrath MD     Cc: ckd    SUBJECTIVE     No complaints    PHYSICAL EXAM   Vital signs: /73   Pulse 77   Temp 98.2 °F (36.8 °C) (Oral)   Resp 19   Wt 213 lb 3.2 o Or  glucose-vitamin C (DEX-4) chewable tab 8 tablet, 8 tablet, Oral, Q15 Min PRN  Insulin Aspart Pen (NOVOLOG) 100 UNIT/ML flexpen 1-5 Units, 1-5 Units, Subcutaneous, TID CC and HS  Heparin Sodium (Porcine) 5000 UNIT/ML injection 5,000 Units, 5,000 Units,

## 2021-09-15 NOTE — PROGRESS NOTES
[facility] Hospitalist Progress Note     PCP: Emmett Falcon MD    Chief Complaint: follow-up    Overnight/Interim Events:      SUBJECTIVE:  Pt denies any cp/sob. 161/75, 86.  Awaiting cath     OBJECTIVE:  Temp:  [97.9 °F (36.6 °C)-98.9 °F (37.2 °C)] 97.9 °F 09/14/21  1908 09/14/21  2052 09/15/21  0826 09/15/21  1209 09/15/21  1624   PGLU 243* 259* 194* 181* 159*       No results for input(s): TROP in the last 168 hours.       Meds:     • aspirin  81 mg Oral Daily   • atorvastatin  40 mg Oral Nightly   • [place] Depression/ Generalized anxiety d/o; Psych  -reviewed home meds, continue SSRI and benzo, currently appears stable   - risperdal      # Gout  - allupurinol   He has previously been having some fatigue and cp prompting cp. Currently feeling well.  PIV previo

## 2021-09-15 NOTE — PLAN OF CARE
Assumed care at [address]. Pt a/ox4. VSS. NSR per tele. RA, cpap at [facility]. Denies pain. IVF infusing per order. Meds given per MAR. NPO since midnight maintained. Pt updated w/ POC. Call light in reach. Needs attended to. Will continue to monitor.

## 2021-09-15 NOTE — PROGRESS NOTES
Cath:    LM: Ok. LAD occluded. RI: Small, 50% disease. CX: Moderate disease, moderate system. RCA: Patent stents. Dominant. Anterior. Mild disease. SVG to DX: Occluded.   LIMA to LAD: Patent LIMA but 90% and 75% lesions in LAD distal to anastamosis

## 2021-09-16 PROBLEM — I25.10 CAD (CORONARY ARTERY DISEASE): Status: ACTIVE | Noted: 2021-09-16

## 2021-09-16 LAB
ALBUMIN SERPL-MCNC: 3 G/DL (ref 3.4–5)
ANION GAP SERPL CALC-SCNC: 6 MMOL/L (ref 0–18)
BUN BLD-MCNC: 43 MG/DL (ref 7–18)
CALCIUM BLD-MCNC: 8.8 MG/DL (ref 8.5–10.1)
CHLORIDE SERPL-SCNC: 113 MMOL/L (ref 98–112)
CO2 SERPL-SCNC: 23 MMOL/L (ref 21–32)
CREAT BLD-MCNC: 1.76 MG/DL
GLUCOSE BLD-MCNC: 165 MG/DL (ref 70–99)
GLUCOSE BLD-MCNC: 187 MG/DL (ref 70–99)
GLUCOSE BLD-MCNC: 245 MG/DL (ref 70–99)
GLUCOSE BLD-MCNC: 270 MG/DL (ref 70–99)
GLUCOSE BLD-MCNC: 294 MG/DL (ref 70–99)
MAGNESIUM SERPL-MCNC: 2 MG/DL (ref 1.6–2.6)
OSMOLALITY SERPL CALC.SUM OF ELEC: 310 MOSM/KG (ref 275–295)
PHOSPHATE SERPL-MCNC: 3.7 MG/DL (ref 2.5–4.9)
POTASSIUM SERPL-SCNC: 4.6 MMOL/L (ref 3.5–5.1)
SODIUM SERPL-SCNC: 142 MMOL/L (ref 136–145)

## 2021-09-16 PROCEDURE — 80069 RENAL FUNCTION PANEL: CPT | Performed by: INTERNAL MEDICINE

## 2021-09-16 PROCEDURE — 82962 GLUCOSE BLOOD TEST: CPT

## 2021-09-16 PROCEDURE — 83735 ASSAY OF MAGNESIUM: CPT | Performed by: INTERNAL MEDICINE

## 2021-09-16 RX ORDER — SODIUM CHLORIDE 9 MG/ML
INJECTION, SOLUTION INTRAVENOUS
Status: ACTIVE | OUTPATIENT
Start: 2021-09-17 | End: 2021-09-17

## 2021-09-16 RX ORDER — AMLODIPINE BESYLATE 5 MG/1
5 TABLET ORAL DAILY
Qty: 30 TABLET | Refills: 1 | Status: SHIPPED | OUTPATIENT
Start: 2021-09-17 | End: 2021-10-12

## 2021-09-16 RX ORDER — ATORVASTATIN CALCIUM 40 MG/1
40 TABLET, FILM COATED ORAL NIGHTLY
Qty: 90 TABLET | Refills: 3 | Status: SHIPPED | OUTPATIENT
Start: 2021-09-16

## 2021-09-16 RX ORDER — HYDRALAZINE HYDROCHLORIDE 20 MG/ML
10 INJECTION INTRAMUSCULAR; INTRAVENOUS EVERY 6 HOURS PRN
Status: DISCONTINUED | OUTPATIENT
Start: 2021-09-16 | End: 2021-09-18

## 2021-09-16 NOTE — PROCEDURES
[facility]    PATIENT'S NAME: Judson Helm   ATTENDING PHYSICIAN: Luc Mendoza M.D. OPERATING PHYSICIAN: Andrea Le.  Gordon Hinton MD   PATIENT ACCOUNT#:   [id number]    LOCATION:  [address] [place] [address]  MEDICAL RECORD #:   [id number]       DATE OF BIRTH: tortuosity proximal to the anastomosis and the target vessel is moderate caliber with a 90% lesion and then a 75% lesion distal to the anastomosis in the midvessel.     CONCLUSIONS:  A 51-year-old with coronary artery disease and symptoms as well as an ante

## 2021-09-16 NOTE — PROGRESS NOTES
[facility]    Nephrology Progress Note    Stevie Goldstein Attending:  Mary Jane Guzman MD     Cc: ckd    SUBJECTIVE     No complaints  Plan for procedure tomorrow    PHYSICAL EXAM   Vital signs: BP (!) 162/71 (BP Location: Right arm)   Pulse 69 50 % injection 50 mL, 50 mL, Intravenous, Q15 Min PRN   Or  glucose (DEX4) oral liquid 30 g, 30 g, Oral, Q15 Min PRN   Or  glucose-vitamin C (DEX-4) chewable tab 8 tablet, 8 tablet, Oral, Q15 Min PRN  Insulin Aspart Pen (NOVOLOG) 100 UNIT/ML flexpen 1-5 Un

## 2021-09-16 NOTE — PROGRESS NOTES
[facility] Cardiology  Progress Note    Heydi Arroyo Patient Status:  Observation    1950 MRN [id number]   [facility] 7NE-A Attending Matt Haile MD   Hosp Day # 0 PCP Kameron Bañuelos MD     Subjective:   Antonio Salvage [Lorazepam]      JITTERY    Comment:Intolerance, made movements worse.  Tolerates             klonopin  Gabapentin              OTHER (SEE COMMENTS)    Comment:weakness  Haldol [Haloperidol]    JITTERY    Comment:Made movements worse  Adhesive Tape 09/10/21  1207   INR 1.0       No results for input(s): TROP, CK in the last 168 hours. Tele:     Diagnostics:    Echo 10/20:   Conclusions     Summary:     1. Left ventricle:  The cavity size is at the upper limits of normal. Wall      thickness is anterior takeoff and is best accessed with an Amplatz catheter in the future. The stents are widely patent with no in-stent restenosis and there is subsequently, only mild disease in the PDA and posterolateral systems.   5.     Saphenous vein graft to diag interrogation with NL EF and negative cath thereafter. 14.  Stress Cardiolite with abn ECG and perfsion c/w ischemia in the distal LAD distribution 8/21. 15.  Atypical CP / dyspnea  16. CRI    Plan:     1. ASA / Plavix  2. Statin  3.   Continue curr

## 2021-09-16 NOTE — PROGRESS NOTES
[facility] Hospitalist Progress Note     PCP: Jose Frank MD    Chief Complaint: follow-up    Overnight/Interim Events:      SUBJECTIVE:  Pt denies any cp/sob. +U, +flatus.  Plan for cath tomorrow    OBJECTIVE:  Temp:  [97.6 °F (36.4 °C)-98.6 °F (37 °C)] 09/15/21  0548 09/16/21  0521   ALT 12  --   --   --    AST 15  --   --   --    ALB 4.3 3.2* 3.0* 3.0*       Recent Labs   Lab 09/15/21  0826 09/15/21  1209 09/15/21  1624 09/15/21  2229 09/16/21  0816   PGLU 194* 181* 159* 300* 165*       No results for i meds  - reports takes 45u Toujeo at bedtime, given 25u as was NPO, -250    # CVA  - asa/plavix/statin     # Major Depression/ Generalized anxiety d/o; Psych  -reviewed home meds, continue SSRI and benzo, currently appears stable   - risperdal      #

## 2021-09-16 NOTE — PLAN OF CARE
Received patient A/Ox4, RA, NSR on tele at 0700  R groin c/d/i, soft with no hematoma  Ambulating to bathroom  Denying pain  Wife at bedside  Plan for cath tomorrow, consent signed and in chart    Problem: Patient/Family Goals  Goal: Patient/Family Long Te

## 2021-09-16 NOTE — PLAN OF CARE
Received pt at 1930  Pt AOx4, NSR, RA/CPAP, VSS  IVF infusing  BL tremors to LUE  R groin cdi, soft w/ no hematoma. Pulses palpable  D/c Planning: Cath lab 9/17? Call light within reach.  All needs currently met

## 2021-09-17 ENCOUNTER — APPOINTMENT (OUTPATIENT)
Dept: INTERVENTIONAL RADIOLOGY/VASCULAR | Facility: HOSPITAL | Age: 71
DRG: 247 | End: 2021-09-17
Attending: NURSE PRACTITIONER
Payer: MEDICARE

## 2021-09-17 LAB
ALBUMIN SERPL-MCNC: 2.9 G/DL (ref 3.4–5)
ANION GAP SERPL CALC-SCNC: 5 MMOL/L (ref 0–18)
BUN BLD-MCNC: 37 MG/DL (ref 7–18)
CALCIUM BLD-MCNC: 8.8 MG/DL (ref 8.5–10.1)
CHLORIDE SERPL-SCNC: 112 MMOL/L (ref 98–112)
CO2 SERPL-SCNC: 23 MMOL/L (ref 21–32)
CREAT BLD-MCNC: 1.59 MG/DL
GLUCOSE BLD-MCNC: 206 MG/DL (ref 70–99)
GLUCOSE BLD-MCNC: 208 MG/DL (ref 70–99)
GLUCOSE BLD-MCNC: 226 MG/DL (ref 70–99)
GLUCOSE BLD-MCNC: 254 MG/DL (ref 70–99)
GLUCOSE BLD-MCNC: 315 MG/DL (ref 70–99)
MAGNESIUM SERPL-MCNC: 1.8 MG/DL (ref 1.6–2.6)
OSMOLALITY SERPL CALC.SUM OF ELEC: 305 MOSM/KG (ref 275–295)
PHOSPHATE SERPL-MCNC: 3.4 MG/DL (ref 2.5–4.9)
POTASSIUM SERPL-SCNC: 4.2 MMOL/L (ref 3.5–5.1)
SODIUM SERPL-SCNC: 140 MMOL/L (ref 136–145)

## 2021-09-17 PROCEDURE — 82962 GLUCOSE BLOOD TEST: CPT

## 2021-09-17 PROCEDURE — 99153 MOD SED SAME PHYS/QHP EA: CPT

## 2021-09-17 PROCEDURE — 92921 HC PTCA EA ADDL MAJOR CORONARY ARTERY/BRANCH: CPT

## 2021-09-17 PROCEDURE — 85347 COAGULATION TIME ACTIVATED: CPT

## 2021-09-17 PROCEDURE — 80069 RENAL FUNCTION PANEL: CPT | Performed by: INTERNAL MEDICINE

## 2021-09-17 PROCEDURE — 83735 ASSAY OF MAGNESIUM: CPT | Performed by: INTERNAL MEDICINE

## 2021-09-17 PROCEDURE — 99152 MOD SED SAME PHYS/QHP 5/>YRS: CPT

## 2021-09-17 PROCEDURE — 027035Z DILATION OF CORONARY ARTERY, ONE ARTERY WITH TWO DRUG-ELUTING INTRALUMINAL DEVICES, PERCUTANEOUS APPROACH: ICD-10-PCS | Performed by: INTERNAL MEDICINE

## 2021-09-17 RX ORDER — SODIUM CHLORIDE 9 MG/ML
INJECTION, SOLUTION INTRAVENOUS CONTINUOUS
Status: ACTIVE | OUTPATIENT
Start: 2021-09-17 | End: 2021-09-17

## 2021-09-17 RX ORDER — CLOPIDOGREL BISULFATE 75 MG/1
75 TABLET ORAL DAILY
Status: DISCONTINUED | OUTPATIENT
Start: 2021-09-18 | End: 2021-09-18

## 2021-09-17 RX ORDER — NITROGLYCERIN 20 MG/100ML
INJECTION INTRAVENOUS
Status: COMPLETED
Start: 2021-09-17 | End: 2021-09-17

## 2021-09-17 RX ORDER — HEPARIN SODIUM 5000 [USP'U]/ML
INJECTION, SOLUTION INTRAVENOUS; SUBCUTANEOUS
Status: COMPLETED
Start: 2021-09-17 | End: 2021-09-17

## 2021-09-17 RX ORDER — LABETALOL HYDROCHLORIDE 5 MG/ML
10 INJECTION, SOLUTION INTRAVENOUS EVERY 6 HOURS PRN
Status: DISCONTINUED | OUTPATIENT
Start: 2021-09-17 | End: 2021-09-18

## 2021-09-17 RX ORDER — ASPIRIN 81 MG/1
81 TABLET ORAL DAILY
Status: DISCONTINUED | OUTPATIENT
Start: 2021-09-18 | End: 2021-09-18

## 2021-09-17 RX ORDER — LIDOCAINE HYDROCHLORIDE 10 MG/ML
INJECTION, SOLUTION EPIDURAL; INFILTRATION; INTRACAUDAL; PERINEURAL
Status: COMPLETED
Start: 2021-09-17 | End: 2021-09-17

## 2021-09-17 RX ORDER — MIDAZOLAM HYDROCHLORIDE 1 MG/ML
INJECTION INTRAMUSCULAR; INTRAVENOUS
Status: COMPLETED
Start: 2021-09-17 | End: 2021-09-17

## 2021-09-17 RX ORDER — VERAPAMIL HYDROCHLORIDE 2.5 MG/ML
INJECTION, SOLUTION INTRAVENOUS
Status: COMPLETED
Start: 2021-09-17 | End: 2021-09-17

## 2021-09-17 RX ORDER — NICARDIPINE HYDROCHLORIDE 2.5 MG/ML
INJECTION INTRAVENOUS
Status: DISCONTINUED
Start: 2021-09-17 | End: 2021-09-17 | Stop reason: WASHOUT

## 2021-09-17 NOTE — PROGRESS NOTES
[facility] Cardiology  Progress Note    Yaneth Hu Patient Status:  Observation    1950 MRN [id number]   [facility] 7NE-A Attending Steve Piña MD   Hosp Day # 1 PCP Emmett Falcon MD     Subjective:   Ray Peres [Lorazepam]      JITTERY    Comment:Intolerance, made movements worse.  Tolerates             klonopin  Gabapentin              OTHER (SEE COMMENTS)    Comment:weakness  Haldol [Haloperidol]    JITTERY    Comment:Made movements worse  Adhesive Tape Labs   Lab 09/10/21  1207   RBC 4.03   HGB 12.5*   HCT 38.3   MCV 95.0   MCH 31.0   MCHC 32.6   RDW 13.0   WBC 7.97          Recent Labs   Lab 09/10/21  1207   INR 1.0       No results for input(s): TROP, CK in the last 168 hours.       Tele: SR QUEEN intermedius has 50% proximal disease and the circumflex has 30% disease. 4.     RCA is dominant and has several stents proximally. Of note, it has an anterior takeoff and is best accessed with an Amplatz catheter in the future.   The stents are widely pat CVA - embolic appearance. ALVA without focal source. 10.Depression. 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 PFO on ALVA  13. VT on past LINQ interrogation with NL EF and negative cath thereafter.   14.  Stress Cardiolite with abn ECG and perfsion c/w

## 2021-09-17 NOTE — PROGRESS NOTES
[facility] Hospitalist Progress Note     PCP: Ernestine Muller MD    Chief Complaint: follow-up    Overnight/Interim Events:      SUBJECTIVE:  Pt s/p PCI to LAD. Laying flat in bed. No cp/sob/n/v/f/c.  Wife at bedside    OBJECTIVE:  Temp:  [97.7 °F (36.5 °C) 09/15/21  0548 09/16/21  0521 09/17/21  0607   ALB 3.2* 3.0* 3.0* 2.9*       Recent Labs   Lab 09/16/21  1337 09/16/21  1657 09/16/21  2039 09/17/21  0613 09/17/21  1256   PGLU 294* 270* 245* 226* 206*       No results for input(s): TROP in the last 168 ho # HL  -statin     # Type 2 diabetes mellitus  -Hyperglycemic protocol with accu-checks QID and low-dose sliding scale insulin. Will keep 1800 ADA diet. Will hold PO meds  - reports takes 45u Toujeo at bedtime, given 25u as was NPO, -250.  Adjust p

## 2021-09-17 NOTE — PLAN OF CARE
Assumed care of patient at 0700  A/Ox4, RA, VSS on tele  Patient sent down to cath lab at 1030, 2 stents placed in LAD  Patient arrived to floor at 1300 , R groin C/D/I no hematoma. TR band on L wrist, air removed per order, no bleeding noted.  Nikunje and pr Progressing

## 2021-09-17 NOTE — PROGRESS NOTES
Cath:    PCI to LAD with 2.48e91ss Gallo and 2.58dxp1fl  [place], posted with 2.5NC and 2.75mm balloons. \"Unjailed\" diagonal with 2.0mm baoollon. Went via native () LAD given lima tortuosity. Excellent flow/result. Dictation to follow.     DAPT (

## 2021-09-17 NOTE — PROGRESS NOTES
Nephrology note  Pt not in room   Labs reviewed.  Continue IVF for 8hrs post contrast.   Thalia Reid MD  [facility]  Nephrology  [address]  [address] [facility]  [place], [address] [facility]

## 2021-09-17 NOTE — PLAN OF CARE
Assumed care 1900  Patient alert, oriented x4  BP high- physician aware, parameters ordered, IV BP medication administered x2  No complaints of pain  NSR on tele  Plan for cath today  R groin soft intact

## 2021-09-17 NOTE — DIETARY NOTE
Clinical Nutrition    Dietitian consult received per cardiac rehab standing order. Pt to be educated by cardiac rehab staff and encouraged to attend outpatient classes taught by RD. RD available PRN.     Elsy Gamez MS, RD, LDN  Clinical Dietitian  Page

## 2021-09-17 NOTE — PROCEDURES
[facility]    PATIENT'S NAME: Charlee Ling   ATTENDING PHYSICIAN: Nelson Acharya. Johan Govea MD   OPERATING PHYSICIAN: Donald Velazco.  Sharyle Poisson, MD   PATIENT ACCOUNT#:   [id number]    LOCATION:  [address] [place] [address]  MEDICAL RECORD #:   [id number]       DATE OF BIRTH: postdilated with a 2.5 mm balloon. We did stent over the diagonal.  We, therefore, re-crossed with the [facility] XT wire and then dilated the stent struts with a 2.0 mm balloon to 10 atmospheres. We then did a final inflation in the main branch with the 2.

## 2021-09-18 VITALS
HEART RATE: 69 BPM | TEMPERATURE: 98 F | BODY MASS INDEX: 29 KG/M2 | DIASTOLIC BLOOD PRESSURE: 64 MMHG | RESPIRATION RATE: 17 BRPM | SYSTOLIC BLOOD PRESSURE: 142 MMHG | WEIGHT: 213.19 LBS | OXYGEN SATURATION: 98 %

## 2021-09-18 LAB
ALBUMIN SERPL-MCNC: 3 G/DL (ref 3.4–5)
ANION GAP SERPL CALC-SCNC: 6 MMOL/L (ref 0–18)
BUN BLD-MCNC: 30 MG/DL (ref 7–18)
CALCIUM BLD-MCNC: 8.5 MG/DL (ref 8.5–10.1)
CHLORIDE SERPL-SCNC: 109 MMOL/L (ref 98–112)
CO2 SERPL-SCNC: 23 MMOL/L (ref 21–32)
CREAT BLD-MCNC: 1.64 MG/DL
ERYTHROCYTE [DISTWIDTH] IN BLOOD BY AUTOMATED COUNT: 12.6 %
GLUCOSE BLD-MCNC: 223 MG/DL (ref 70–99)
GLUCOSE BLD-MCNC: 229 MG/DL (ref 70–99)
HCT VFR BLD AUTO: 35.3 %
HGB BLD-MCNC: 11.7 G/DL
ISTAT ACTIVATED CLOTTING TIME: 252 SECONDS (ref 74–137)
MAGNESIUM SERPL-MCNC: 1.8 MG/DL (ref 1.6–2.6)
MCH RBC QN AUTO: 29.9 PG (ref 26–34)
MCHC RBC AUTO-ENTMCNC: 33.1 G/DL (ref 31–37)
MCV RBC AUTO: 90.3 FL
OSMOLALITY SERPL CALC.SUM OF ELEC: 299 MOSM/KG (ref 275–295)
PHOSPHATE SERPL-MCNC: 2.9 MG/DL (ref 2.5–4.9)
PLATELET # BLD AUTO: 158 10(3)UL (ref 150–450)
POTASSIUM SERPL-SCNC: 4.4 MMOL/L (ref 3.5–5.1)
RBC # BLD AUTO: 3.91 X10(6)UL
SODIUM SERPL-SCNC: 138 MMOL/L (ref 136–145)
WBC # BLD AUTO: 7.8 X10(3) UL (ref 4–11)

## 2021-09-18 PROCEDURE — 80069 RENAL FUNCTION PANEL: CPT | Performed by: INTERNAL MEDICINE

## 2021-09-18 PROCEDURE — 83735 ASSAY OF MAGNESIUM: CPT | Performed by: INTERNAL MEDICINE

## 2021-09-18 PROCEDURE — 82962 GLUCOSE BLOOD TEST: CPT

## 2021-09-18 PROCEDURE — 85027 COMPLETE CBC AUTOMATED: CPT | Performed by: INTERNAL MEDICINE

## 2021-09-18 NOTE — PLAN OF CARE
Assumed care at [address]. Alert and oriented x4. Telemetry monitor reading SR. IVF infusing assessed and maintained. No pain. Lt groin dressing c/d/I. Plan to discharge home. Call light in reach at bedside. Will continue to monitor.       Problem: Patient/Famil hemostasis  - Assess for signs and symptoms of internal bleeding  - Monitor lab trends  - Patient is to report abnormal signs of bleeding to staff  - Avoid use of toothpicks and dental floss  - Use electric shaver for shaving  - Use soft bristle tooth brus

## 2021-09-18 NOTE — PROGRESS NOTES
[facility]    Nephrology Progress Note    Jessi Christensen Attending:  Linette Flores MD     Cc: ckd    SUBJECTIVE     Sp cath 9/18 PCI to LAD  No complaints    PHYSICAL EXAM   Vital signs: /84 (BP Location: Right arm)   Pulse (!) 133   Te Q4H PRN  glucose (DEX4) oral liquid 15 g, 15 g, Oral, Q15 Min PRN   Or  glucose-vitamin C (DEX-4) chewable tab 4 tablet, 4 tablet, Oral, Q15 Min PRN   Or  dextrose 50 % injection 50 mL, 50 mL, Intravenous, Q15 Min PRN   Or  glucose (DEX4) oral liquid 30 g, hesitate to call with questions or concerns.        Monique García MD  [address] Nephrology

## 2021-09-18 NOTE — CARDIAC REHAB
CAD education completed with pt, [address], pt has done CR program there in past.

## 2021-09-18 NOTE — PLAN OF CARE
Assumed care at 0700. Pt A/O x4. RA. CPAP at night. NSR on tele. VSS. R groin site is c/d/i and soft. Pedal pulses palpable. L wrist site with pressure dressing is c/d/i. All consults cleared for discharge. IV removed. Tele removed.  Pt discharged home via

## 2021-10-18 PROBLEM — Z86.73 HX OF TRANSIENT ISCHEMIC ATTACK (TIA): Status: ACTIVE | Noted: 2021-10-18

## 2021-10-18 PROBLEM — R41.9 COGNITIVE COMPLAINTS: Status: ACTIVE | Noted: 2021-10-18

## 2021-10-18 PROBLEM — G44.40 REBOUND HEADACHE: Status: ACTIVE | Noted: 2021-10-18

## 2021-10-18 PROBLEM — G44.209 TENSION HEADACHE: Status: ACTIVE | Noted: 2021-10-18

## 2021-11-12 NOTE — DISCHARGE SUMMARY
General Medicine Discharge Summary     Patient ID:  Heydi Arroyo  70year old  8/23/1950    Admit date: 9/14/2021    Discharge date and time: 9/18/2021    Attending Physician: No att. providers found abnormal nuclear stress   # severe aortic stenosis s/p bioprosthetic AVR 2011  # CAD s/p CABG and GRANT to RCA 5/2018  # HTN  - LHC 9/15 noted by Dr. Dima Wadsworth, Intervention could be difficult given tortuosity and lesion length.  Thus optimal to hydrate and stage Historical    escitalopram 5 MG Oral Tab  Take 5 mg by mouth at bedtime. , Historical    Insulin Pen Needle (BD PEN NEEDLE SHORT U/F) 31G X 8 MM Does not apply Misc  USE AS DIRECTED 6 TIMES DAILY, Normal, Disp-600 each, R-3    LOSARTAN POTASSIUM 25 MG Oral strip, R-3    VALACYCLOVIR HCL 1 G Oral Tab  TAKE 2 TABLETS BY MOUTH EVERY 12 HOURS FOR 1 DAY AT ONSET OF COLD SORE, Normal, Disp-28 tablet, R-0    !! Glucose Blood (ONETOUCH ULTRA BLUE) In Vitro Strip  TEST 5 TIMES DAILY. E11.9,type 2 DM IDDM, Normal, Disp turgor normal. No visible lesions.     Neurologic: Moving all extremities spontaneously, no  dysarthria              Total time coordinating care for discharge: Greater than 30 minutes    Jordan Awan MD  [facility] Hospitalist  [phone]

## 2022-01-19 PROBLEM — M19.012 PRIMARY OSTEOARTHRITIS OF LEFT SHOULDER: Status: ACTIVE | Noted: 2022-01-19

## 2022-03-15 PROBLEM — M25.551 CHRONIC PAIN OF RIGHT HIP: Status: ACTIVE | Noted: 2022-03-15

## 2022-03-15 PROBLEM — G89.29 CHRONIC PAIN OF RIGHT HIP: Status: ACTIVE | Noted: 2022-03-15

## 2022-04-06 ENCOUNTER — HOSPITAL ENCOUNTER (OUTPATIENT)
Dept: MRI IMAGING | Facility: HOSPITAL | Age: 72
Discharge: HOME | End: 2022-04-06
Attending: Other
Payer: MEDICARE

## 2022-04-06 DIAGNOSIS — E11.42 DIABETIC POLYNEUROPATHY ASSOCIATED WITH TYPE 2 DIABETES MELLITUS (HCC): ICD-10-CM

## 2022-04-06 DIAGNOSIS — Z86.73 HISTORY OF STROKE: ICD-10-CM

## 2022-04-06 DIAGNOSIS — G25.5 HEMIBALLISM: ICD-10-CM

## 2022-04-06 DIAGNOSIS — I63.411 CEREBROVASCULAR ACCIDENT (CVA) DUE TO EMBOLISM OF RIGHT MIDDLE CEREBRAL ARTERY (HCC): ICD-10-CM

## 2022-04-06 PROCEDURE — 70551 MRI BRAIN STEM W/O DYE: CPT | Performed by: OTHER

## 2022-06-07 NOTE — LETTER
[facility] [address], [address]  Consent for Procedure/Sedation    Date:     Time:       1.  I authorize the performance upon Illa Reef the following: Cardiac catheterization, left ventricular cineangiography, bilateral karolyn Relationship to  to consent for patient: _________________________ patient: ___________________    Witness: _______________________________ Date: _____________________    Printed: 2021   9:35 AM  Patient Name: Ira Cardoso        : 1950 No

## 2023-10-05 NOTE — CM/SW NOTE
DIRECT ADMISSION    Admitting MD: Dr. Aby Soriano in by: ENRIKE HAGAN [facility]  Call back #: [phone]  Date of admission: 10/09/23  Diagnosis: CAD, CKD  Specialist MD:  N/A  Hospitalist assigned: N/A  Type of admission: INPT  Type of bed: Cardiac Telemetry  Time of admission: 1000  Insurance Verification complete:  Yes

## 2023-10-09 ENCOUNTER — APPOINTMENT (OUTPATIENT)
Dept: GENERAL RADIOLOGY | Facility: HOSPITAL | Age: 73
End: 2023-10-09
Attending: NURSE PRACTITIONER
Payer: MEDICARE

## 2023-10-09 ENCOUNTER — HOSPITAL ENCOUNTER (INPATIENT)
Facility: HOSPITAL | Age: 73
LOS: 2 days | Discharge: HOME | DRG: 287 | End: 2023-10-11
Attending: INTERNAL MEDICINE | Admitting: INTERNAL MEDICINE
Payer: MEDICARE

## 2023-10-09 ENCOUNTER — APPOINTMENT (OUTPATIENT)
Dept: GENERAL RADIOLOGY | Facility: HOSPITAL | Age: 73
DRG: 287 | End: 2023-10-09
Attending: NURSE PRACTITIONER
Payer: MEDICARE

## 2023-10-09 ENCOUNTER — HOSPITAL ENCOUNTER (INPATIENT)
Facility: HOSPITAL | Age: 73
LOS: 2 days | Discharge: HOME | End: 2023-10-11
Attending: INTERNAL MEDICINE | Admitting: INTERNAL MEDICINE
Payer: MEDICARE

## 2023-10-09 PROBLEM — I25.708 CORONARY ARTERY DISEASE OF BYPASS GRAFT OF NATIVE HEART WITH STABLE ANGINA PECTORIS (HCC): Status: ACTIVE | Noted: 2023-10-09

## 2023-10-09 LAB
ALBUMIN SERPL-MCNC: 3.6 G/DL (ref 3.4–5)
ALBUMIN/GLOB SERPL: 1.2 {RATIO} (ref 1–2)
ALP LIVER SERPL-CCNC: 90 U/L
ALT SERPL-CCNC: 21 U/L
ANION GAP SERPL CALC-SCNC: 4 MMOL/L (ref 0–18)
AST SERPL-CCNC: 14 U/L (ref 15–37)
BILIRUB SERPL-MCNC: 0.3 MG/DL (ref 0.1–2)
BUN BLD-MCNC: 48 MG/DL (ref 7–18)
CALCIUM BLD-MCNC: 9.1 MG/DL (ref 8.5–10.1)
CHLORIDE SERPL-SCNC: 110 MMOL/L (ref 98–112)
CO2 SERPL-SCNC: 25 MMOL/L (ref 21–32)
CREAT BLD-MCNC: 2.48 MG/DL
EGFRCR SERPLBLD CKD-EPI 2021: 27 ML/MIN/1.73M2 (ref 60–?)
ERYTHROCYTE [DISTWIDTH] IN BLOOD BY AUTOMATED COUNT: 13.2 %
GLOBULIN PLAS-MCNC: 3 G/DL (ref 2.8–4.4)
GLUCOSE BLD-MCNC: 107 MG/DL (ref 70–99)
GLUCOSE BLD-MCNC: 144 MG/DL (ref 70–99)
GLUCOSE BLD-MCNC: 215 MG/DL (ref 70–99)
HCT VFR BLD AUTO: 38.3 %
HGB BLD-MCNC: 12.5 G/DL
INR BLD: 1.05 (ref 0.85–1.16)
MCH RBC QN AUTO: 31 PG (ref 26–34)
MCHC RBC AUTO-ENTMCNC: 32.6 G/DL (ref 31–37)
MCV RBC AUTO: 95 FL
OSMOLALITY SERPL CALC.SUM OF ELEC: 303 MOSM/KG (ref 275–295)
PLATELET # BLD AUTO: 178 10(3)UL (ref 150–450)
POTASSIUM SERPL-SCNC: 4.7 MMOL/L (ref 3.5–5.1)
PROT SERPL-MCNC: 6.6 G/DL (ref 6.4–8.2)
PROTHROMBIN TIME: 13.7 SECONDS (ref 11.6–14.8)
RBC # BLD AUTO: 4.03 X10(6)UL
SODIUM SERPL-SCNC: 139 MMOL/L (ref 136–145)
WBC # BLD AUTO: 7.5 X10(3) UL (ref 4–11)

## 2023-10-09 PROCEDURE — 85027 COMPLETE CBC AUTOMATED: CPT | Performed by: NURSE PRACTITIONER

## 2023-10-09 PROCEDURE — 93005 ELECTROCARDIOGRAM TRACING: CPT

## 2023-10-09 PROCEDURE — 93010 ELECTROCARDIOGRAM REPORT: CPT | Performed by: INTERNAL MEDICINE

## 2023-10-09 PROCEDURE — 83036 HEMOGLOBIN GLYCOSYLATED A1C: CPT | Performed by: HOSPITALIST

## 2023-10-09 PROCEDURE — 82962 GLUCOSE BLOOD TEST: CPT

## 2023-10-09 PROCEDURE — 80053 COMPREHEN METABOLIC PANEL: CPT | Performed by: NURSE PRACTITIONER

## 2023-10-09 PROCEDURE — 71045 X-RAY EXAM CHEST 1 VIEW: CPT | Performed by: NURSE PRACTITIONER

## 2023-10-09 PROCEDURE — 85610 PROTHROMBIN TIME: CPT | Performed by: NURSE PRACTITIONER

## 2023-10-09 PROCEDURE — 94799 UNLISTED PULMONARY SVC/PX: CPT

## 2023-10-09 RX ORDER — ALBUTEROL SULFATE 90 UG/1
2 AEROSOL, METERED RESPIRATORY (INHALATION) EVERY 4 HOURS PRN
Status: DISCONTINUED | OUTPATIENT
Start: 2023-10-09 | End: 2023-10-11

## 2023-10-09 RX ORDER — ESCITALOPRAM OXALATE 5 MG/1
5 TABLET ORAL NIGHTLY
Status: DISCONTINUED | OUTPATIENT
Start: 2023-10-09 | End: 2023-10-09

## 2023-10-09 RX ORDER — SODIUM BICARBONATE 325 MG/1
650 TABLET ORAL 3 TIMES DAILY
Status: DISCONTINUED | OUTPATIENT
Start: 2023-10-09 | End: 2023-10-11

## 2023-10-09 RX ORDER — NICOTINE POLACRILEX 4 MG
15 LOZENGE BUCCAL
Status: DISCONTINUED | OUTPATIENT
Start: 2023-10-09 | End: 2023-10-11

## 2023-10-09 RX ORDER — MELATONIN
3 NIGHTLY
Status: DISCONTINUED | OUTPATIENT
Start: 2023-10-09 | End: 2023-10-11

## 2023-10-09 RX ORDER — AMLODIPINE BESYLATE 5 MG/1
5 TABLET ORAL DAILY
Status: DISCONTINUED | OUTPATIENT
Start: 2023-10-10 | End: 2023-10-10

## 2023-10-09 RX ORDER — ROSUVASTATIN CALCIUM 20 MG/1
20 TABLET, COATED ORAL NIGHTLY
Status: DISCONTINUED | OUTPATIENT
Start: 2023-10-09 | End: 2023-10-11

## 2023-10-09 RX ORDER — CLONAZEPAM 1 MG/1
1 TABLET ORAL NIGHTLY PRN
Status: DISCONTINUED | OUTPATIENT
Start: 2023-10-09 | End: 2023-10-11

## 2023-10-09 RX ORDER — ALLOPURINOL 300 MG/1
300 TABLET ORAL DAILY
Status: DISCONTINUED | OUTPATIENT
Start: 2023-10-10 | End: 2023-10-11

## 2023-10-09 RX ORDER — NICOTINE POLACRILEX 4 MG
30 LOZENGE BUCCAL
Status: DISCONTINUED | OUTPATIENT
Start: 2023-10-09 | End: 2023-10-11

## 2023-10-09 RX ORDER — ASPIRIN 81 MG/1
81 TABLET ORAL DAILY
Status: DISCONTINUED | OUTPATIENT
Start: 2023-10-10 | End: 2023-10-11

## 2023-10-09 RX ORDER — RISPERIDONE 0.5 MG/1
0.5 TABLET ORAL 2 TIMES DAILY
Status: DISCONTINUED | OUTPATIENT
Start: 2023-10-09 | End: 2023-10-11

## 2023-10-09 RX ORDER — SODIUM CHLORIDE 9 MG/ML
INJECTION, SOLUTION INTRAVENOUS CONTINUOUS
Status: ACTIVE | OUTPATIENT
Start: 2023-10-09 | End: 2023-10-10

## 2023-10-09 RX ORDER — ROSUVASTATIN CALCIUM 20 MG/1
20 TABLET, COATED ORAL NIGHTLY
COMMUNITY

## 2023-10-09 RX ORDER — ATORVASTATIN CALCIUM 40 MG/1
40 TABLET, FILM COATED ORAL NIGHTLY
Status: DISCONTINUED | OUTPATIENT
Start: 2023-10-09 | End: 2023-10-09

## 2023-10-09 RX ORDER — LOSARTAN POTASSIUM 25 MG/1
12.5 TABLET ORAL DAILY
Status: DISCONTINUED | OUTPATIENT
Start: 2023-10-10 | End: 2023-10-10

## 2023-10-09 RX ORDER — CLOPIDOGREL BISULFATE 75 MG/1
75 TABLET ORAL
Status: DISCONTINUED | OUTPATIENT
Start: 2023-10-10 | End: 2023-10-11

## 2023-10-09 RX ORDER — DEXTROSE MONOHYDRATE 25 G/50ML
50 INJECTION, SOLUTION INTRAVENOUS
Status: DISCONTINUED | OUTPATIENT
Start: 2023-10-09 | End: 2023-10-11

## 2023-10-09 NOTE — CONSULTS
[facility] Hospitalist Consult Note     Bladimir Collier Patient Status:  Inpatient    1950 MRN [id number]   [facility] 8NE-A Attending Brad Hines MD   Hosp Day # 0 PCP Everton Funez MD     Consult: medical comanagement     Consulting Provider: Brad Hines MD    History of Present Illness: Bladimir Collier is a 68year old male with CAD s/p CABG x2, DM2, MDD, CKD 4 with recent abnormal outpatient stress test who presents for prehydration prior to scheduled angiogram tomorrow. Following arrival to the floor he reports feeling well at baseline and denies CP, SOB, nausea or any other new or worsening symptoms. Past Medical History:  Past Medical History:   Diagnosis Date    Aortic valve disorders     Bio AVR and CABG X 2 V. S/P CABG x 2V     Atherosclerosis of coronary artery     Coronary atherosclerosis 2018    PCI with Casimiro to prox and mid RCA. 99% occlusion of mRCA had been found    Depression     Diabetes ([facility] [address]     Goiter     bx ,     Hemiballism 2013    ? related to severe hyperglycemia, CVA. following Neurology at [facility]. HYPERLIPIDEMIA     HYPERTENSION     KIDNEY STONE     uric acid; takes gout medication for stones    Peyronie's disease     2009    Sleep apnea     patient wanted to trial, never did. does not use cpap. Stroke [facility])         Past Surgical History:   Past Surgical History:   Procedure Laterality Date    AORTIC HRT VALVE W/CARD BYP      BACK SURGERY      lumbar laminectomy 89    BYPASS SURGERY  11    CABG X 2 with LIMA to LAD, SVG to diag    CABG      CATH PERCUTANEOUS  TRANSLUMINAL CORONARY ANGIOPLASTY      COLONOSCOPY  12/15/2011 - MARIA ELENA Moore    hemorrhoids, repeat .     COLONOSCOPY  12-15-11    COLONOSCOPY,DIAGNOSTIC  12/15/2011    Performed by Miguelangel Jackson at . [facility] 2007    right    KNEE REPLACEMENT SURGERY  2008    left    OTHER SURGICAL HISTORY right shoulder aa/lysis of adhesions    OTHER SURGICAL HISTORY  11/27/12    left sh aa. debride rct and labrum with manip    VALVE REPLACEMENT  1/25/11    23mm Cresencio-Clark Magna Ease Bovine        Social History:  reports that he has never smoked. He has never used smokeless tobacco. He reports that he does not drink alcohol and does not use drugs. Family History:   Family History   Problem Relation Age of Onset    Heart Disorder Father     Cancer Mother         pancreatic cancer    Heart Disorder Brother        Allergies:   Ativan [Lorazepam]      JITTERY    Comment:Intolerance, made movements worse. Tolerates             klonopin  Gabapentin              OTHER (SEE COMMENTS)    Comment:weakness  Haldol [Haloperidol]    JITTERY    Comment:Made movements worse  Adhesive Tape           RASH  Latex                   RASH    Medications:  No current facility-administered medications on file prior to encounter. TOUVERONICA SOLOSTAR 300 UNIT/ML Subcutaneous Solution Pen-injector, INJECT 45 UNITS INTO THE SKIN NIGHTLY., Disp: 27 mL, Rfl: 1  ONETOUCH ULTRA In Vitro Strip, USE TO TEST FIVE TIMES DAILY, Disp: 500 strip, Rfl: 3  ALLOPURINOL 300 MG Oral Tab, TAKE 1 TABLET BY MOUTH  DAILY, Disp: 90 tablet, Rfl: 0  Blood Glucose Monitoring Suppl (ONETOUCH ULTRA 2) w/Device Does not apply Kit, Test 5 times daily,E11.9 type 2 DM IDDM, Disp: 1 kit, Rfl: 0  clopidogrel 75 MG Oral Tab, Take 1 tablet (75 mg total) by mouth once daily. , Disp: 90 tablet, Rfl: 3  valACYclovir 1 G Oral Tab, Take 2 tablets (2,000 mg total) by mouth every 12 (twelve) hours as needed. , Disp: 28 tablet, Rfl: 0  amoxicillin 500 MG Oral Cap, 4 caps one hour prior to dental procedure., Disp: 20 capsule, Rfl: 0  sodium bicarbonate 650 MG Oral Tab, Take 1 tablet (650 mg total) by mouth 3 (three) times daily. , Disp: 270 tablet, Rfl: 3  amLODIPine 10 MG Oral Tab, Take 1 tablet (10 mg total) by mouth daily. , Disp: 90 tablet, Rfl: 3  atorvastatin 40 MG Oral Tab, Take 1 tablet (40 mg total) by mouth nightly., Disp: 90 tablet, Rfl: 3  Acetaminophen (TYLENOL EXTRA STRENGTH OR), Take by mouth as needed. , Disp: , Rfl:   BENZONATATE OR, Take by mouth as needed. , Disp: , Rfl:   escitalopram 5 MG Oral Tab, Take 5 mg by mouth at bedtime. , Disp: , Rfl:   Insulin Pen Needle (BD PEN NEEDLE SHORT U/F) 31G X 8 MM Does not apply Misc, USE AS DIRECTED 6 TIMES DAILY, Disp: 600 each, Rfl: 3  LOSARTAN POTASSIUM 25 MG Oral Tab, TAKE 1/2 TABLET BY MOUTH EVERY DAY, Disp: 45 tablet, Rfl: 3  Cholecalciferol (VITAMIN D3) 125 MCG (5000 UT) Oral Tab, Take 1 capsule by mouth daily. , Disp: , Rfl:   Continuous Blood Gluc Transmit (DEXCOM G6 TRANSMITTER) Does not apply Misc, 1 each by Does not apply route every 3 (three) months., Disp: 1 each, Rfl: 3  Continuous Blood Gluc Sensor (DEXCOM G6 SENSOR) Does not apply Misc, 1 each by Does not apply route Every 10 days. , Disp: 9 each, Rfl: 3  Insulin Lispro, 1 Unit Dial, (HUMALOG KWIKPEN) 100 UNIT/ML Subcutaneous Solution Pen-injector, INJECT SUBCUTANEOUSLY UP TO 30 UNITS 3 TIMES DAILY WITH MEALS, Disp: 90 mL, Rfl: 3  OneTouch UltraSoft Lancets Does not apply Misc, Test 5 times daily,E11.9 type 2 DM IDDM, Disp: 500 each, Rfl: 0  Glucose Blood (ONETOUCH ULTRA BLUE) In Vitro Strip, TEST 5 TIMES DAILY. E11.9,type 2 DM IDDM, Disp: 500 strip, Rfl: 3  Albuterol Sulfate  (90 Base) MCG/ACT Inhalation Aero Soln, Inhale 2 puffs into the lungs every 4 (four) hours as needed for Wheezing., Disp: 1 Inhaler, Rfl: 0  insulin glargine (TOUJEO SOLOSTAR) 300 UNIT/ML Subcutaneous Solution Pen-injector, Inject 38 Units into the skin nightly. (Patient taking differently: Inject 45 Units into the skin nightly.), Disp: 13.5 mL, Rfl: 1  cetirizine 10 MG Oral Tab, Take 10 mg by mouth daily as needed. , Disp: , Rfl:   aspirin 81 MG Oral Tab, Take 81 mg by mouth daily. , Disp: , Rfl:   risperiDONE (RISPERDAL) 0.5 MG Oral Tab, Take 0.5 mg by mouth 2 (two) times daily.   , Disp: , Rfl:   melatonin 3 MG Oral Tab, Take 3 mg by mouth nightly., Disp: , Rfl:   clonazepam (KLONOPIN) 1 MG Oral Tab, Take 1 mg by mouth nightly.  , Disp: , Rfl:         Review of Systems:   A comprehensive 14 point review of systems was completed. Pertinent positives and negatives noted in the HPI. Physical Exam:    There were no vitals taken for this visit. General: No acute distress. Comfortable, nontoxic   HEENT: Normocephalic atraumatic. Moist mucous membranes; Sclera anicteric. Neck: Supple, no JVD  Respiratory: Clear to auscultation bilaterally. Reg resp rate & effort, no wheezes/crackles   Cardiovascular: S1, S2. Regular rate and rhythm. No murmurs appreciable   Chest and Back: No tenderness or deformity. Abdomen: Soft, nontender, nondistended. Positive bowel sounds. No rebound, guarding or organomegaly. Neurologic: No focal neurological deficits. CNII-XII grossly intact. Musculoskeletal: Moves all extremities. Extremities: No edema or cyanosis. Integument: No rashes or lesions. Psychiatric: Appropriate mood and affect. Diagnostic Data:      Labs:  No results for input(s): \"WBC\", \"HGB\", \"MCV\", \"PLT\", \"BAND\", \"INR\" in the last 168 hours. Invalid input(s): \"LYM#\", \"MONO#\", \"BASOS#\", \"EOSIN#\"    No results for input(s): \"GLU\", \"BUN\", \"CREATSERUM\", \"GFRAA\", \"GFRNAA\", \"CA\", \"ALB\", \"NA\", \"K\", \"CL\", \"CO2\", \"ALKPHO\", \"AST\", \"ALT\", \"BILT\", \"TP\" in the last 168 hours. CrCl cannot be calculated (Patient's most recent lab result is older than the maximum 7 days allowed. ). No results for input(s): \"PTP\", \"INR\" in the last 168 hours. No results for input(s): \"TROP\", \"CK\" in the last 168 hours. Imaging: Imaging data reviewed in Epic.       ASSESSMENT / PLAN:     Cliff Boyle Is a a 68year old male who presents with abnormal outpatient stress test    Problem List / Diagnoses    CAD  HTN  HL  CKD4  MDD  Dm2    Recommendations    CAD  HTN  HL  -- has been having worsening SERRANO, outpatient nuclear stress test with reversible defect  -- planned for angiogram tomorrow, will need pre- and post-hydration given CKD4  -- Cardiology following, plan discussed  -- cont asa, amlodipine, plavix, losartan, crestor     CKD4  -- nephrology consulted for assistance given upcoming angiogram; will follow up recommendations   -- prehydration for angiogram per above    MDD  -- resume jessie rsiperdal    Dm2  -- decrease levemir to half dose in anticipation of angiogram  - accuchecks, ssi-medium  - holding oral antihyperglycemics     DVT Ppx: DVT Mechanical Prophylaxis:        DVT Pharmacologic Prophylaxis   Medication   None         DVT Pharmacologic prophylaxis: Aspirin 81 mg     Code Status: Full Code      Dispo: observation; JAYESH 1-2 days pending interventios, postop course, specialist celarance     Plan of care discussed with patient and/or family at bedside. [facility] Hospitalist will continue to follow while admitted.      Jocelyne Brennan MD  [address] [place] [address]

## 2023-10-10 ENCOUNTER — APPOINTMENT (OUTPATIENT)
Dept: INTERVENTIONAL RADIOLOGY/VASCULAR | Facility: HOSPITAL | Age: 73
DRG: 287 | End: 2023-10-10
Attending: NURSE PRACTITIONER
Payer: MEDICARE

## 2023-10-10 ENCOUNTER — APPOINTMENT (OUTPATIENT)
Dept: INTERVENTIONAL RADIOLOGY/VASCULAR | Facility: HOSPITAL | Age: 73
End: 2023-10-10
Attending: NURSE PRACTITIONER
Payer: MEDICARE

## 2023-10-10 LAB
ALBUMIN SERPL-MCNC: 3.1 G/DL (ref 3.4–5)
ANION GAP SERPL CALC-SCNC: 5 MMOL/L (ref 0–18)
ATRIAL RATE: 85 BPM
BASOPHILS # BLD AUTO: 0.03 X10(3) UL (ref 0–0.2)
BASOPHILS NFR BLD AUTO: 0.4 %
BUN BLD-MCNC: 40 MG/DL (ref 7–18)
CALCIUM BLD-MCNC: 8.6 MG/DL (ref 8.5–10.1)
CHLORIDE SERPL-SCNC: 113 MMOL/L (ref 98–112)
CO2 SERPL-SCNC: 22 MMOL/L (ref 21–32)
CREAT BLD-MCNC: 1.96 MG/DL
EGFRCR SERPLBLD CKD-EPI 2021: 35 ML/MIN/1.73M2 (ref 60–?)
EOSINOPHIL # BLD AUTO: 0.17 X10(3) UL (ref 0–0.7)
EOSINOPHIL NFR BLD AUTO: 2.3 %
ERYTHROCYTE [DISTWIDTH] IN BLOOD BY AUTOMATED COUNT: 13.1 %
EST. AVERAGE GLUCOSE BLD GHB EST-MCNC: 137 MG/DL (ref 68–126)
GLUCOSE BLD-MCNC: 151 MG/DL (ref 70–99)
GLUCOSE BLD-MCNC: 155 MG/DL (ref 70–99)
GLUCOSE BLD-MCNC: 157 MG/DL (ref 70–99)
GLUCOSE BLD-MCNC: 158 MG/DL (ref 70–99)
GLUCOSE BLD-MCNC: 316 MG/DL (ref 70–99)
HBA1C MFR BLD: 6.4 % (ref ?–5.7)
HCT VFR BLD AUTO: 35.8 %
HGB BLD-MCNC: 12 G/DL
IMM GRANULOCYTES # BLD AUTO: 0.02 X10(3) UL (ref 0–1)
IMM GRANULOCYTES NFR BLD: 0.3 %
LYMPHOCYTES # BLD AUTO: 1.38 X10(3) UL (ref 1–4)
LYMPHOCYTES NFR BLD AUTO: 18.7 %
MCH RBC QN AUTO: 31.5 PG (ref 26–34)
MCHC RBC AUTO-ENTMCNC: 33.5 G/DL (ref 31–37)
MCV RBC AUTO: 94 FL
MONOCYTES # BLD AUTO: 0.63 X10(3) UL (ref 0.1–1)
MONOCYTES NFR BLD AUTO: 8.5 %
NEUTROPHILS # BLD AUTO: 5.15 X10 (3) UL (ref 1.5–7.7)
NEUTROPHILS # BLD AUTO: 5.15 X10(3) UL (ref 1.5–7.7)
NEUTROPHILS NFR BLD AUTO: 69.8 %
OSMOLALITY SERPL CALC.SUM OF ELEC: 303 MOSM/KG (ref 275–295)
P-R INTERVAL: 164 MS
PHOSPHATE SERPL-MCNC: 3.1 MG/DL (ref 2.5–4.9)
PLATELET # BLD AUTO: 159 10(3)UL (ref 150–450)
POTASSIUM SERPL-SCNC: 5 MMOL/L (ref 3.5–5.1)
Q-T INTERVAL: 372 MS
QRS DURATION: 94 MS
QTC CALCULATION (BEZET): 442 MS
R AXIS: 163 DEGREES
RBC # BLD AUTO: 3.81 X10(6)UL
SODIUM SERPL-SCNC: 140 MMOL/L (ref 136–145)
T AXIS: 20 DEGREES
VENTRICULAR RATE: 85 BPM
WBC # BLD AUTO: 7.4 X10(3) UL (ref 4–11)

## 2023-10-10 PROCEDURE — 82962 GLUCOSE BLOOD TEST: CPT

## 2023-10-10 PROCEDURE — 99152 MOD SED SAME PHYS/QHP 5/>YRS: CPT | Performed by: INTERNAL MEDICINE

## 2023-10-10 PROCEDURE — 80069 RENAL FUNCTION PANEL: CPT | Performed by: INTERNAL MEDICINE

## 2023-10-10 PROCEDURE — 5A09357 ASSISTANCE WITH RESPIRATORY VENTILATION, LESS THAN 24 CONSECUTIVE HOURS, CONTINUOUS POSITIVE AIRWAY PRESSURE: ICD-10-PCS | Performed by: HOSPITALIST

## 2023-10-10 PROCEDURE — 94660 CPAP INITIATION&MGMT: CPT

## 2023-10-10 PROCEDURE — 93455 CORONARY ART/GRFT ANGIO S&I: CPT | Performed by: INTERNAL MEDICINE

## 2023-10-10 PROCEDURE — 94799 UNLISTED PULMONARY SVC/PX: CPT

## 2023-10-10 PROCEDURE — B218YZZ FLUOROSCOPY OF LEFT INTERNAL MAMMARY BYPASS GRAFT USING OTHER CONTRAST: ICD-10-PCS | Performed by: INTERNAL MEDICINE

## 2023-10-10 PROCEDURE — 4A023N7 MEASUREMENT OF CARDIAC SAMPLING AND PRESSURE, LEFT HEART, PERCUTANEOUS APPROACH: ICD-10-PCS | Performed by: INTERNAL MEDICINE

## 2023-10-10 PROCEDURE — B211YZZ FLUOROSCOPY OF MULTIPLE CORONARY ARTERIES USING OTHER CONTRAST: ICD-10-PCS | Performed by: INTERNAL MEDICINE

## 2023-10-10 PROCEDURE — 85025 COMPLETE CBC W/AUTO DIFF WBC: CPT | Performed by: HOSPITALIST

## 2023-10-10 RX ORDER — MIDAZOLAM HYDROCHLORIDE 1 MG/ML
INJECTION INTRAMUSCULAR; INTRAVENOUS
Status: COMPLETED
Start: 2023-10-10 | End: 2023-10-10

## 2023-10-10 RX ORDER — IODIXANOL 320 MG/ML
100 INJECTION, SOLUTION INTRAVASCULAR
Status: COMPLETED | OUTPATIENT
Start: 2023-10-10 | End: 2023-10-10

## 2023-10-10 RX ORDER — HEPARIN SODIUM 5000 [USP'U]/ML
INJECTION, SOLUTION INTRAVENOUS; SUBCUTANEOUS
Status: COMPLETED
Start: 2023-10-10 | End: 2023-10-10

## 2023-10-10 RX ORDER — AMLODIPINE BESYLATE 5 MG/1
5 TABLET ORAL 2 TIMES DAILY
Status: DISCONTINUED | OUTPATIENT
Start: 2023-10-10 | End: 2023-10-11

## 2023-10-10 RX ORDER — ACETAMINOPHEN 325 MG/1
650 TABLET ORAL EVERY 6 HOURS PRN
Status: DISCONTINUED | OUTPATIENT
Start: 2023-10-10 | End: 2023-10-11

## 2023-10-10 RX ORDER — LIDOCAINE HYDROCHLORIDE 10 MG/ML
INJECTION, SOLUTION EPIDURAL; INFILTRATION; INTRACAUDAL; PERINEURAL
Status: COMPLETED
Start: 2023-10-10 | End: 2023-10-10

## 2023-10-10 NOTE — PROGRESS NOTES
Cath:    LM: Mild. LAD: Occluded ostium. CX: Patent into 2 OMs. Mild to moderate disease. RCA: Funny take-off, anterior. AL1 to reach. Dominant, large, moderate disease. LIMA: Patent to LAD with long stented segment. No ISR. No obstructive disease. Moderate at worst.    Perclose. Defer to Dr. Galindo Martinez but consider home today. 60cc contrast used. Gentle hydration after cath. Dictatioin to follow.     [facility] MD

## 2023-10-10 NOTE — PLAN OF CARE
Prepped for angiogram. IVF infusing per order. Consent signed and in chart. Pt and wife updated on POC       Problem: SAFETY ADULT - FALL  Goal: Free from fall injury  Description: INTERVENTIONS:  - Assess pt frequently for physical needs  - Identify cognitive and physical deficits and behaviors that affect risk of falls.   - [place] fall precautions as indicated by assessment.  - Educate pt/family on patient safety including physical limitations  - Instruct pt to call for assistance with activity based on assessment  - Modify environment to reduce risk of injury  - Provide assistive devices as appropriate  - Consider OT/PT consult to assist with strengthening/mobility  - Encourage toileting schedule  Outcome: Progressing     Problem: DISCHARGE PLANNING  Goal: Discharge to home or other facility with appropriate resources  Description: INTERVENTIONS:  - Identify barriers to discharge w/pt and caregiver  - Include patient/family/discharge partner in discharge planning  - Arrange for needed discharge resources and transportation as appropriate  - Identify discharge learning needs (meds, wound care, etc)  - Arrange for interpreters to assist at discharge as needed  - Consider post-discharge preferences of patient/family/discharge partner  - Complete POLST form as appropriate  - Assess patient's ability to be responsible for managing their own health  - Refer to Case Management Department for coordinating discharge planning if the patient needs post-hospital services based on physician/LIP order or complex needs related to functional status, cognitive ability or social support system  Outcome: Progressing

## 2023-10-10 NOTE — PLAN OF CARE
Patient received direct admit. Patient A&O x4, skin clean dry and intact, patient on room air, denies chest pain, no shortness of breath. Admission navigator complete. IV access in left AC, IV fluids running per orders.

## 2023-10-10 NOTE — PLAN OF CARE
Assumed care of patient from SUNI GUERA [facility] RN. Patient is A&O x4, up standby assist, no complaints of pain. Patient is sinus rhythm on room air, no chest pain no shortness of breath, no edema. Peripheral pulse palpable in bilateral upper and lower extremities. IV fluids running per orders. Patient is continent of bladder and bowel, last bm yesterday per patient. Prior to admission. Fall precautions in place, bed and chair alarm in use, call light and belongings Patient prepped for angiogram today, Consent signed and in chart. Problem: SAFETY ADULT - FALL  Goal: Free from fall injury  Description: INTERVENTIONS:  - Assess pt frequently for physical needs  - Identify cognitive and physical deficits and behaviors that affect risk of falls.   - [place] fall precautions as indicated by assessment.  - Educate pt/family on patient safety including physical limitations  - Instruct pt to call for assistance with activity based on assessment  - Modify environment to reduce risk of injury  - Provide assistive devices as appropriate  - Consider OT/PT consult to assist with strengthening/mobility  - Encourage toileting schedule  Outcome: Progressing     Problem: DISCHARGE PLANNING  Goal: Discharge to home or other facility with appropriate resources  Description: INTERVENTIONS:  - Identify barriers to discharge w/pt and caregiver  - Include patient/family/discharge partner in discharge planning  - Arrange for needed discharge resources and transportation as appropriate  - Identify discharge learning needs (meds, wound care, etc)  - Arrange for interpreters to assist at discharge as needed  - Consider post-discharge preferences of patient/family/discharge partner  - Complete POLST form as appropriate  - Assess patient's ability to be responsible for managing their own health  - Refer to Case Management Department for coordinating discharge planning if the patient needs post-hospital services based on physician/LIP order or complex needs related to functional status, cognitive ability or social support system  Outcome: Progressing     Problem: CARDIOVASCULAR - ADULT  Goal: Maintains optimal cardiac output and hemodynamic stability  Description: INTERVENTIONS:  - Monitor vital signs, rhythm, and trends  - Monitor for bleeding, hypotension and signs of decreased cardiac output  - Evaluate effectiveness of vasoactive medications to optimize hemodynamic stability  - Monitor arterial and/or venous puncture sites for bleeding and/or hematoma  - Assess quality of pulses, skin color and temperature  - Assess for signs of decreased coronary artery perfusion - ex.  Angina  - Evaluate fluid balance, assess for edema, trend weights  Outcome: Progressing  Goal: Absence of cardiac arrhythmias or at baseline  Description: INTERVENTIONS:  - Continuous cardiac monitoring, monitor vital signs, obtain 12 lead EKG if indicated  - Evaluate effectiveness of antiarrhythmic and heart rate control medications as ordered  - Initiate emergency measures for life threatening arrhythmias  - Monitor electrolytes and administer replacement therapy as ordered  Outcome: Progressing     Problem: METABOLIC/FLUID AND ELECTROLYTES - ADULT  Goal: Glucose maintained within prescribed range  Description: INTERVENTIONS:  - Monitor Blood Glucose as ordered  - Assess for signs and symptoms of hyperglycemia and hypoglycemia  - Administer ordered medications to maintain glucose within target range  - Assess barriers to adequate nutritional intake and initiate nutrition consult as needed  - Instruct patient on self management of diabetes  Outcome: Progressing  Goal: Electrolytes maintained within normal limits  Description: INTERVENTIONS:  - Monitor labs and rhythm and assess patient for signs and symptoms of electrolyte imbalances  - Administer electrolyte replacement as ordered  - Monitor response to electrolyte replacements, including rhythm and repeat lab results as appropriate  - Fluid restriction as ordered  - Instruct patient on fluid and nutrition restrictions as appropriate  Outcome: Progressing

## 2023-10-11 VITALS
BODY MASS INDEX: 28.04 KG/M2 | DIASTOLIC BLOOD PRESSURE: 86 MMHG | HEART RATE: 69 BPM | HEIGHT: 72 IN | TEMPERATURE: 98 F | WEIGHT: 207 LBS | SYSTOLIC BLOOD PRESSURE: 143 MMHG | RESPIRATION RATE: 18 BRPM | OXYGEN SATURATION: 98 %

## 2023-10-11 LAB
ALBUMIN SERPL-MCNC: 2.9 G/DL (ref 3.4–5)
ANION GAP SERPL CALC-SCNC: 8 MMOL/L (ref 0–18)
BASOPHILS # BLD AUTO: 0.03 X10(3) UL (ref 0–0.2)
BASOPHILS NFR BLD AUTO: 0.4 %
BUN BLD-MCNC: 37 MG/DL (ref 7–18)
CALCIUM BLD-MCNC: 8.6 MG/DL (ref 8.5–10.1)
CHLORIDE SERPL-SCNC: 110 MMOL/L (ref 98–112)
CO2 SERPL-SCNC: 22 MMOL/L (ref 21–32)
CREAT BLD-MCNC: 1.86 MG/DL
EGFRCR SERPLBLD CKD-EPI 2021: 38 ML/MIN/1.73M2 (ref 60–?)
EOSINOPHIL # BLD AUTO: 0.25 X10(3) UL (ref 0–0.7)
EOSINOPHIL NFR BLD AUTO: 3.1 %
ERYTHROCYTE [DISTWIDTH] IN BLOOD BY AUTOMATED COUNT: 12.9 %
GLUCOSE BLD-MCNC: 163 MG/DL (ref 70–99)
GLUCOSE BLD-MCNC: 164 MG/DL (ref 70–99)
HCT VFR BLD AUTO: 36.5 %
HGB BLD-MCNC: 12 G/DL
IMM GRANULOCYTES # BLD AUTO: 0.02 X10(3) UL (ref 0–1)
IMM GRANULOCYTES NFR BLD: 0.3 %
LYMPHOCYTES # BLD AUTO: 1.97 X10(3) UL (ref 1–4)
LYMPHOCYTES NFR BLD AUTO: 24.8 %
MCH RBC QN AUTO: 30.8 PG (ref 26–34)
MCHC RBC AUTO-ENTMCNC: 32.9 G/DL (ref 31–37)
MCV RBC AUTO: 93.8 FL
MONOCYTES # BLD AUTO: 0.75 X10(3) UL (ref 0.1–1)
MONOCYTES NFR BLD AUTO: 9.4 %
NEUTROPHILS # BLD AUTO: 4.92 X10 (3) UL (ref 1.5–7.7)
NEUTROPHILS # BLD AUTO: 4.92 X10(3) UL (ref 1.5–7.7)
NEUTROPHILS NFR BLD AUTO: 62 %
OSMOLALITY SERPL CALC.SUM OF ELEC: 302 MOSM/KG (ref 275–295)
PHOSPHATE SERPL-MCNC: 3.6 MG/DL (ref 2.5–4.9)
PLATELET # BLD AUTO: 162 10(3)UL (ref 150–450)
POTASSIUM SERPL-SCNC: 4.7 MMOL/L (ref 3.5–5.1)
RBC # BLD AUTO: 3.89 X10(6)UL
SODIUM SERPL-SCNC: 140 MMOL/L (ref 136–145)
WBC # BLD AUTO: 7.9 X10(3) UL (ref 4–11)

## 2023-10-11 PROCEDURE — 80069 RENAL FUNCTION PANEL: CPT | Performed by: INTERNAL MEDICINE

## 2023-10-11 PROCEDURE — 94799 UNLISTED PULMONARY SVC/PX: CPT

## 2023-10-11 PROCEDURE — 82962 GLUCOSE BLOOD TEST: CPT

## 2023-10-11 PROCEDURE — 85025 COMPLETE CBC W/AUTO DIFF WBC: CPT | Performed by: HOSPITALIST

## 2023-10-11 RX ORDER — AMLODIPINE BESYLATE 5 MG/1
5 TABLET ORAL 2 TIMES DAILY
Qty: 180 TABLET | Refills: 3 | Status: SHIPPED | OUTPATIENT
Start: 2023-10-11

## 2023-10-11 NOTE — PLAN OF CARE
A&Ox4. O2 sat WNL on RA. Cpap at night. SR on tele. R groin soft, no hematoma. Strong pedal pulse. C/o tenderness at site. Tylenol given. IVF stopped at 2200 per order. Voiding per urinal without difficulty. Up with SBA. Plan; discharge home in am     Problem: SAFETY ADULT - FALL  Goal: Free from fall injury  Description: INTERVENTIONS:  - Assess pt frequently for physical needs  - Identify cognitive and physical deficits and behaviors that affect risk of falls.   - [place] fall precautions as indicated by assessment.  - Educate pt/family on patient safety including physical limitations  - Instruct pt to call for assistance with activity based on assessment  - Modify environment to reduce risk of injury  - Provide assistive devices as appropriate  - Consider OT/PT consult to assist with strengthening/mobility  - Encourage toileting schedule  Outcome: Progressing     Problem: DISCHARGE PLANNING  Goal: Discharge to home or other facility with appropriate resources  Description: INTERVENTIONS:  - Identify barriers to discharge w/pt and caregiver  - Include patient/family/discharge partner in discharge planning  - Arrange for needed discharge resources and transportation as appropriate  - Identify discharge learning needs (meds, wound care, etc)  - Arrange for interpreters to assist at discharge as needed  - Consider post-discharge preferences of patient/family/discharge partner  - Complete POLST form as appropriate  - Assess patient's ability to be responsible for managing their own health  - Refer to Case Management Department for coordinating discharge planning if the patient needs post-hospital services based on physician/LIP order or complex needs related to functional status, cognitive ability or social support system  Outcome: Progressing     Problem: CARDIOVASCULAR - ADULT  Goal: Maintains optimal cardiac output and hemodynamic stability  Description: INTERVENTIONS:  - Monitor vital signs, rhythm, and trends  - Monitor for bleeding, hypotension and signs of decreased cardiac output  - Evaluate effectiveness of vasoactive medications to optimize hemodynamic stability  - Monitor arterial and/or venous puncture sites for bleeding and/or hematoma  - Assess quality of pulses, skin color and temperature  - Assess for signs of decreased coronary artery perfusion - ex.  Angina  - Evaluate fluid balance, assess for edema, trend weights  Outcome: Progressing  Goal: Absence of cardiac arrhythmias or at baseline  Description: INTERVENTIONS:  - Continuous cardiac monitoring, monitor vital signs, obtain 12 lead EKG if indicated  - Evaluate effectiveness of antiarrhythmic and heart rate control medications as ordered  - Initiate emergency measures for life threatening arrhythmias  - Monitor electrolytes and administer replacement therapy as ordered  Outcome: Progressing     Problem: METABOLIC/FLUID AND ELECTROLYTES - ADULT  Goal: Glucose maintained within prescribed range  Description: INTERVENTIONS:  - Monitor Blood Glucose as ordered  - Assess for signs and symptoms of hyperglycemia and hypoglycemia  - Administer ordered medications to maintain glucose within target range  - Assess barriers to adequate nutritional intake and initiate nutrition consult as needed  - Instruct patient on self management of diabetes  Outcome: Progressing  Goal: Electrolytes maintained within normal limits  Description: INTERVENTIONS:  - Monitor labs and rhythm and assess patient for signs and symptoms of electrolyte imbalances  - Administer electrolyte replacement as ordered  - Monitor response to electrolyte replacements, including rhythm and repeat lab results as appropriate  - Fluid restriction as ordered  - Instruct patient on fluid and nutrition restrictions as appropriate  Outcome: Progressing

## 2023-10-11 NOTE — PLAN OF CARE
NURSING DISCHARGE NOTE    Discharged Home via Wheelchair. Accompanied by RN and Support staff  Belongings Taken by patient/family. Groin site c/d/I. Mild bruising but no hematoma or oozing present. Dressing removed. Discussed discharge instructions along with medications and follow up appointments with wife and pt at bedside. All questions answered. Provided print out of work excuse/return to rehab form to wife. Problem: SAFETY ADULT - FALL  Goal: Free from fall injury  Description: INTERVENTIONS:  - Assess pt frequently for physical needs  - Identify cognitive and physical deficits and behaviors that affect risk of falls.   - [place] fall precautions as indicated by assessment.  - Educate pt/family on patient safety including physical limitations  - Instruct pt to call for assistance with activity based on assessment  - Modify environment to reduce risk of injury  - Provide assistive devices as appropriate  - Consider OT/PT consult to assist with strengthening/mobility  - Encourage toileting schedule  Outcome: Adequate for Discharge     Problem: DISCHARGE PLANNING  Goal: Discharge to home or other facility with appropriate resources  Description: INTERVENTIONS:  - Identify barriers to discharge w/pt and caregiver  - Include patient/family/discharge partner in discharge planning  - Arrange for needed discharge resources and transportation as appropriate  - Identify discharge learning needs (meds, wound care, etc)  - Arrange for interpreters to assist at discharge as needed  - Consider post-discharge preferences of patient/family/discharge partner  - Complete POLST form as appropriate  - Assess patient's ability to be responsible for managing their own health  - Refer to Case Management Department for coordinating discharge planning if the patient needs post-hospital services based on physician/LIP order or complex needs related to functional status, cognitive ability or social support system  Outcome: Adequate for Discharge     Problem: CARDIOVASCULAR - ADULT  Goal: Maintains optimal cardiac output and hemodynamic stability  Description: INTERVENTIONS:  - Monitor vital signs, rhythm, and trends  - Monitor for bleeding, hypotension and signs of decreased cardiac output  - Evaluate effectiveness of vasoactive medications to optimize hemodynamic stability  - Monitor arterial and/or venous puncture sites for bleeding and/or hematoma  - Assess quality of pulses, skin color and temperature  - Assess for signs of decreased coronary artery perfusion - ex.  Angina  - Evaluate fluid balance, assess for edema, trend weights  Outcome: Adequate for Discharge  Goal: Absence of cardiac arrhythmias or at baseline  Description: INTERVENTIONS:  - Continuous cardiac monitoring, monitor vital signs, obtain 12 lead EKG if indicated  - Evaluate effectiveness of antiarrhythmic and heart rate control medications as ordered  - Initiate emergency measures for life threatening arrhythmias  - Monitor electrolytes and administer replacement therapy as ordered  Outcome: Adequate for Discharge     Problem: METABOLIC/FLUID AND ELECTROLYTES - ADULT  Goal: Glucose maintained within prescribed range  Description: INTERVENTIONS:  - Monitor Blood Glucose as ordered  - Assess for signs and symptoms of hyperglycemia and hypoglycemia  - Administer ordered medications to maintain glucose within target range  - Assess barriers to adequate nutritional intake and initiate nutrition consult as needed  - Instruct patient on self management of diabetes  Outcome: Adequate for Discharge  Goal: Electrolytes maintained within normal limits  Description: INTERVENTIONS:  - Monitor labs and rhythm and assess patient for signs and symptoms of electrolyte imbalances  - Administer electrolyte replacement as ordered  - Monitor response to electrolyte replacements, including rhythm and repeat lab results as appropriate  - Fluid restriction as ordered  - Instruct patient on fluid and nutrition restrictions as appropriate  Outcome: Adequate for Discharge

## 2023-10-23 NOTE — PROCEDURES
[facility]    PATIENT'S NAME: Judy Coley   ATTENDING PHYSICIAN: Jalil Holland M.D. OPERATING PHYSICIAN: Mir Baird MD   PATIENT ACCOUNT#:   [id number]    LOCATION:  [address] [place] [address]  MEDICAL RECORD #:   [id number]       YOB: 1950  ADMISSION DATE:       10/09/2023      OPERATION DATE:  10/10/2023    CARDIAC PROCEDURE TRANSCRIPTION      CARDIAC CATHETERIZATION     PREOPERATIVE DIAGNOSIS:  Coronary artery disease. POSTOPERATIVE DIAGNOSIS:  Coronary artery disease. PROCEDURE PERFORMED:      SEDATION:  We gave 3 mg of Versed and 75 mcg of fentanyl from 0115 until 0146 under my direct supervision with independent nursing monitoring performed by the catheterization lab staff. COMPLICATIONS:  None. PROCEDURAL DETAILS:  Informed consent was obtained. Please see history and physical by Dr. Jalil Holland. We gained femoral access in the standard fashion using a micropuncture kit and using a 6-Cymraes slender sheath. We used standard [place] catheters to engage the coronaries as well as the subclavian and subsequently the REYNALDO. Standard technique was utilized and at the end of the case we used a Perclose device for right femoral arterial hemostasis. ANGIOGRAPHIC FINDINGS:    1. The left main has mild disease. 2.   The LAD is occluded at the ostium and fills via the REYNALDO graft. 3.   The circumflex is patent into 2 obtuse marginal branches. There is mild to moderate disease in the circumflex system, but no significant disease identified. 4.   Right coronary is a large dominant vessel with an anterior takeoff that we needed an AL1 in order to cannulate. There is moderate diffuse disease noted in the right coronary system. 5.   LIMA to LAD is a widely patent arterial conduit to a moderate caliber LAD which has a long stented segment in the mid into the distal vessel. There is no significant in-stent restenosis. Overall, there is only mild, to at most moderate, disease. Distal to the stented segment, there is a kink in the vessel as it is quite tortuous and a moderate plaque which is also noted diffusely, but no obstructive lesions are identified. CONCLUSIONS:  A 79-year-old with CAD who is symptomatic. The patient has no clear \"culprit lesion\" by today's study. The natives were patent with moderate disease and the LIMA to LAD is widely patent with no significant in-stent restenosis in the LAD. There is, at most, moderate disease noted. The patient will need aggressive medical management. The patient will follow with Dr. Alice Novoa for ongoing evaluation and therapy. We only utilized 60 mL of contrast today and the patient will be gently hydrated. Given the finding and the need for further hydration, the patient will likely be discharged tomorrow. Dictated By Taz Robbins MD  d: 10/22/2023 21:46:36  t: 10/22/2023 23:22:23  [facility] [id number]  DKV/

## 2024-06-01 ENCOUNTER — HOSPITAL ENCOUNTER (EMERGENCY)
Age: 74
Discharge: HOME | End: 2024-06-01
Attending: EMERGENCY MEDICINE
Payer: MEDICARE

## 2024-06-01 ENCOUNTER — APPOINTMENT (OUTPATIENT)
Dept: ULTRASOUND IMAGING | Age: 74
End: 2024-06-01
Attending: PHYSICIAN ASSISTANT
Payer: MEDICARE

## 2024-06-01 VITALS
DIASTOLIC BLOOD PRESSURE: 65 MMHG | HEART RATE: 73 BPM | SYSTOLIC BLOOD PRESSURE: 162 MMHG | RESPIRATION RATE: 18 BRPM | OXYGEN SATURATION: 99 % | TEMPERATURE: 98 F

## 2024-06-01 DIAGNOSIS — M54.2 NECK PAIN: Primary | ICD-10-CM

## 2024-06-01 LAB
ALBUMIN SERPL-MCNC: 3.7 G/DL (ref 3.4–5)
ALBUMIN/GLOB SERPL: 1.3 {RATIO} (ref 1–2)
ALP LIVER SERPL-CCNC: 85 U/L
ALT SERPL-CCNC: 23 U/L
ANION GAP SERPL CALC-SCNC: 3 MMOL/L (ref 0–18)
AST SERPL-CCNC: 12 U/L (ref 15–37)
BASOPHILS # BLD AUTO: 0.05 X10(3) UL (ref 0–0.2)
BASOPHILS NFR BLD AUTO: 0.6 %
BILIRUB SERPL-MCNC: 0.4 MG/DL (ref 0.1–2)
BUN BLD-MCNC: 51 MG/DL (ref 9–23)
CALCIUM BLD-MCNC: 9.4 MG/DL (ref 8.5–10.1)
CHLORIDE SERPL-SCNC: 108 MMOL/L (ref 98–112)
CO2 SERPL-SCNC: 27 MMOL/L (ref 21–32)
CREAT BLD-MCNC: 2.42 MG/DL
EGFRCR SERPLBLD CKD-EPI 2021: 28 ML/MIN/1.73M2 (ref 60–?)
EOSINOPHIL # BLD AUTO: 0.11 X10(3) UL (ref 0–0.7)
EOSINOPHIL NFR BLD AUTO: 1.4 %
ERYTHROCYTE [DISTWIDTH] IN BLOOD BY AUTOMATED COUNT: 12.4 %
GLOBULIN PLAS-MCNC: 2.9 G/DL (ref 2.8–4.4)
GLUCOSE BLD-MCNC: 184 MG/DL (ref 70–99)
HCT VFR BLD AUTO: 38.6 %
HGB BLD-MCNC: 13.3 G/DL
IMM GRANULOCYTES # BLD AUTO: 0.02 X10(3) UL (ref 0–1)
IMM GRANULOCYTES NFR BLD: 0.2 %
LYMPHOCYTES # BLD AUTO: 1.47 X10(3) UL (ref 1–4)
LYMPHOCYTES NFR BLD AUTO: 18.3 %
MCH RBC QN AUTO: 31.6 PG (ref 26–34)
MCHC RBC AUTO-ENTMCNC: 34.5 G/DL (ref 31–37)
MCV RBC AUTO: 91.7 FL
MONOCYTES # BLD AUTO: 0.65 X10(3) UL (ref 0.1–1)
MONOCYTES NFR BLD AUTO: 8.1 %
NEUTROPHILS # BLD AUTO: 5.75 X10 (3) UL (ref 1.5–7.7)
NEUTROPHILS # BLD AUTO: 5.75 X10(3) UL (ref 1.5–7.7)
NEUTROPHILS NFR BLD AUTO: 71.4 %
OSMOLALITY SERPL CALC.SUM OF ELEC: 304 MOSM/KG (ref 275–295)
PLATELET # BLD AUTO: 183 10(3)UL (ref 150–450)
POTASSIUM SERPL-SCNC: 4.3 MMOL/L (ref 3.5–5.1)
PROT SERPL-MCNC: 6.6 G/DL (ref 6.4–8.2)
RBC # BLD AUTO: 4.21 X10(6)UL
SODIUM SERPL-SCNC: 138 MMOL/L (ref 136–145)
WBC # BLD AUTO: 8.1 X10(3) UL (ref 4–11)

## 2024-06-01 PROCEDURE — 99284 EMERGENCY DEPT VISIT MOD MDM: CPT

## 2024-06-01 PROCEDURE — 96360 HYDRATION IV INFUSION INIT: CPT

## 2024-06-01 PROCEDURE — 93880 EXTRACRANIAL BILAT STUDY: CPT | Performed by: PHYSICIAN ASSISTANT

## 2024-06-01 PROCEDURE — 85025 COMPLETE CBC W/AUTO DIFF WBC: CPT | Performed by: PHYSICIAN ASSISTANT

## 2024-06-01 PROCEDURE — 99285 EMERGENCY DEPT VISIT HI MDM: CPT

## 2024-06-01 PROCEDURE — 80053 COMPREHEN METABOLIC PANEL: CPT | Performed by: PHYSICIAN ASSISTANT

## 2024-06-01 NOTE — ED PROVIDER NOTES
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  Patient woke up today complaining of right anterior neck pain.  No sore throat no upper respiratory complaints no fevers no chills no difficulty swallowing.  He does complain of some pain in the ear.  He denies significant headache.  Denies radiation of pain.  Patient states the pain is always there patient denies any thing that makes the pain better or worse.    Objective:   Past Medical History:    Aortic valve disorders    Bio AVR and CABG X 2 V.S/P CABG x 2V 1/11    Atherosclerosis of coronary artery    Back problem    Congenital anomaly of heart (HCC)    Coronary atherosclerosis    PCI with Casimiro to prox and mid RCA.  99% occlusion of mRCA had been found    Depression    Diabetes (HCC)    Goiter    bx 2008,     Hemiballism    ? related to severe hyperglycemia, CVA. following Neurology at [facility].    HYPERLIPIDEMIA    HYPERTENSION    KIDNEY STONE    uric acid; takes gout medication for stones    Muscle weakness    Osteoarthritis    Peyronie's disease    2009    Shortness of breath    Sleep apnea    patient wanted to trial, never did. does not use cpap.    Stroke (HCC)              Past Surgical History:   Procedure Laterality Date    Aortic hrt valve w/card byp  2011    Back surgery      lumbar laminectomy 95    Bypass surgery  01/25/2011    CABG X 2 with LIMA to LAD, SVG to diag    Cabg      Cath percutaneous  transluminal coronary angioplasty      Colonoscopy  12/15/2011 - MARIA ELENA Moore    hemorrhoids, repeat 2021.    Colonoscopy  12/15/2011    Colonoscopy,diagnostic  12/15/2011    Performed by AUDELIA  OZIEL HAGAN at [facility] [place]    Knee replacement surgery  2007    right    Knee replacement surgery  2008    left    Other surgical history      right shoulder aa/lysis of adhesions    Other surgical history  11/27/2012    left sh aa.debride rct and labrum with manip    Spine surgery procedure unlisted      Valve repair      Valve replacement  01/25/2011    23mm Cresencio-Clark Magna Ease Bovine                 Social History     Socioeconomic History    Marital status:     Number of children: 2   Occupational History    Occupation: retired   Tobacco Use    Smoking status: Never    Smokeless tobacco: Never   Vaping Use    Vaping status: Never Used   Substance and Sexual Activity    Alcohol use: No    Drug use: No     Social Determinants of Health     Food Insecurity: No Food Insecurity (10/9/2023)    Food Insecurity     Food Insecurity: Never true   Transportation Needs: No Transportation Needs (10/9/2023)    Transportation Needs     Lack of Transportation: No   Housing Stability: Low Risk  (10/9/2023)    Housing Stability     Housing Instability: No              Review of Systems    Positive for stated complaint: throbbing pain to r side of thorat  Other systems are as noted in HPI.  Constitutional and vital signs reviewed.      All other systems reviewed and negative except as noted above.    Physical Exam     ED Triage Vitals [06/01/24 1308]   /64   Pulse 79   Resp 20   Temp 98.4 °F (36.9 °C)   Temp src Temporal   SpO2 95 %   O2 Device        Current Vitals:   Vital Signs  BP: 137/64  Pulse: 79  Resp: 20  Temp: 98.4 °F (36.9 °C)  Temp src: Temporal    Oxygen Therapy  SpO2: 95 %            Physical Exam  Vitals and nursing note reviewed.   Constitutional:       General: He is not in acute distress.     Appearance: He is well-developed. He is not diaphoretic.   HENT:      Head: Normocephalic and atraumatic.      Right Ear: Tympanic membrane, ear canal and external ear normal. No drainage or  swelling. No middle ear effusion. Tympanic membrane is not erythematous.      Left Ear: Tympanic membrane, ear canal and external ear normal. No drainage or swelling.  No middle ear effusion. Tympanic membrane is not erythematous.      Nose: No congestion.      Mouth/Throat:      Mouth: Mucous membranes are moist.      Pharynx: Oropharynx is clear. Uvula midline. No pharyngeal swelling, oropharyngeal exudate or posterior oropharyngeal erythema.      Tonsils: No tonsillar exudate or tonsillar abscesses.   Eyes:      Conjunctiva/sclera: Conjunctivae normal.      Pupils: Pupils are equal, round, and reactive to light.   Neck:      Trachea: Trachea and phonation normal.   Cardiovascular:      Rate and Rhythm: Normal rate and regular rhythm.      Heart sounds: Normal heart sounds.   Pulmonary:      Effort: Pulmonary effort is normal. No respiratory distress.      Breath sounds: Normal breath sounds. No wheezing or rales.   Musculoskeletal:         General: No tenderness. Normal range of motion.      Cervical back: Full passive range of motion without pain and normal range of motion. No signs of trauma, rigidity or torticollis. No spinous process tenderness or muscular tenderness. Normal range of motion.   Lymphadenopathy:      Cervical: No cervical adenopathy.   Skin:     General: Skin is warm and dry.      Capillary Refill: Capillary refill takes less than 2 seconds.      Coloration: Skin is not pale.      Findings: No erythema or rash.   Neurological:      General: No focal deficit present.      Mental Status: He is alert and oriented to person, place, and time.      Comments: Patient with movement disorder on left which is chronic post stroke no new deficit    Psychiatric:         Behavior: Behavior normal.         Thought Content: Thought content normal.         Judgment: Judgment normal.             ED Course     Labs Reviewed   COMP METABOLIC PANEL (14) - Abnormal; Notable for the following components:       Result  Value    Glucose 184 (*)     BUN 51 (*)     Creatinine 2.42 (*)     Calculated Osmolality 304 (*)     eGFR-Cr 28 (*)     AST 12 (*)     All other components within normal limits   CBC W/ DIFFERENTIAL - Abnormal; Notable for the following components:    HCT 38.6 (*)     All other components within normal limits   CBC WITH DIFFERENTIAL WITH PLATELET    Narrative:     The following orders were created for panel order CBC With Differential With Platelet.  Procedure                               Abnormality         Status                     ---------                               -----------         ------                     CBC W/ DIFFERENTIAL[[id number]]          Abnormal            Final result                 Please view results for these tests on the individual orders.       US carotid, CTA unable to be completed secondary to CKD    MDM          Medical Decision Making  73-year-old male with multiple comorbidities who comes in today complaining of waking up with right-sided neck pain.  Nothing makes it better or worse.  Denies any headache or neuro symptoms.  Wife states that he is at his normal baseline following his CVA.  No speech deficits no visual deficits.  They are concerned because he has stenosis of the carotid artery but no stent on the right side he did have carotid enterectomy and stent placement on the left.    Amount and/or Complexity of Data Reviewed  Independent Historian: spouse     Details: wife  External Data Reviewed: labs.     Details: Reviewed patient's previous laboratory work including kidney function patient always has elevation in kidney function last BUN and creatinine was from 3/2024 2.18 creatinine, 39 BUN  Labs: ordered. Decision-making details documented in ED Course.     Details: CBC: Normal hemoglobin, platelet count and white blood cell count  CMP shows a nonfasting glucose of 184, BUN 51, creatinine 2.42, GFR 28    Patient's kidney function is slightly bumped from patient's  previous, discussed with radiology patients GFR is less than 30 so CT contrast can not be administered US will be ordered   Radiology: ordered and independent interpretation performed. Decision-making details documented in ED Course.     Details: Ultrasound carotid Doppler to rule out dissection  ECG/medicine tests: ordered and independent interpretation performed. Decision-making details documented in ED Course.     Details: Normal saline been patient's elevated kidney function  Discussion of management or test interpretation with external provider(s): Discussed with and evaluated the patient with Dr. Nazario who agrees with assessment and plan.    4:05 PM   Sign out given to Dr Nazario US pending     Disposition and Plan     Clinical Impression:  No diagnosis found.     Disposition:  There is no disposition on file for this visit.  There is no disposition time on file for this visit.    Follow-up:  No follow-up provider specified.        Medications Prescribed:  Current Discharge Medication List

## 2024-07-31 ENCOUNTER — APPOINTMENT (OUTPATIENT)
Dept: INTERVENTIONAL RADIOLOGY/VASCULAR | Facility: HOSPITAL | Age: 74
End: 2024-07-31
Attending: INTERNAL MEDICINE
Payer: MEDICARE

## 2024-07-31 ENCOUNTER — APPOINTMENT (OUTPATIENT)
Dept: GENERAL RADIOLOGY | Age: 74
End: 2024-07-31
Attending: STUDENT IN AN ORGANIZED HEALTH CARE EDUCATION/TRAINING PROGRAM
Payer: MEDICARE

## 2024-07-31 ENCOUNTER — HOSPITAL ENCOUNTER (INPATIENT)
Facility: HOSPITAL | Age: 74
LOS: 1 days | Discharge: HOME | End: 2024-08-02
Attending: EMERGENCY MEDICINE | Admitting: INTERNAL MEDICINE
Payer: MEDICARE

## 2024-07-31 DIAGNOSIS — N28.9 RENAL INSUFFICIENCY: ICD-10-CM

## 2024-07-31 DIAGNOSIS — R07.9 ACUTE CHEST PAIN: Primary | ICD-10-CM

## 2024-07-31 LAB
ALBUMIN SERPL-MCNC: 3.6 G/DL (ref 3.4–5)
ALBUMIN/GLOB SERPL: 1.4 {RATIO} (ref 1–2)
ALP LIVER SERPL-CCNC: 81 U/L
ALT SERPL-CCNC: 26 U/L
ANION GAP SERPL CALC-SCNC: 5 MMOL/L (ref 0–18)
APTT PPP: 24.6 SECONDS (ref 23–36)
AST SERPL-CCNC: 13 U/L (ref 15–37)
BASOPHILS # BLD AUTO: 0.05 X10(3) UL (ref 0–0.2)
BASOPHILS NFR BLD AUTO: 0.6 %
BILIRUB SERPL-MCNC: 0.4 MG/DL (ref 0.1–2)
BUN BLD-MCNC: 46 MG/DL (ref 9–23)
CALCIUM BLD-MCNC: 9 MG/DL (ref 8.5–10.1)
CHLORIDE SERPL-SCNC: 107 MMOL/L (ref 98–112)
CO2 SERPL-SCNC: 28 MMOL/L (ref 21–32)
CREAT BLD-MCNC: 2.42 MG/DL
EGFRCR SERPLBLD CKD-EPI 2021: 28 ML/MIN/1.73M2 (ref 60–?)
EOSINOPHIL # BLD AUTO: 0.22 X10(3) UL (ref 0–0.7)
EOSINOPHIL NFR BLD AUTO: 2.8 %
ERYTHROCYTE [DISTWIDTH] IN BLOOD BY AUTOMATED COUNT: 13.1 %
GLOBULIN PLAS-MCNC: 2.6 G/DL (ref 2.8–4.4)
GLUCOSE BLD-MCNC: 81 MG/DL (ref 70–99)
HCT VFR BLD AUTO: 36.4 %
HGB BLD-MCNC: 12.6 G/DL
IMM GRANULOCYTES # BLD AUTO: 0.03 X10(3) UL (ref 0–1)
IMM GRANULOCYTES NFR BLD: 0.4 %
LYMPHOCYTES # BLD AUTO: 2.19 X10(3) UL (ref 1–4)
LYMPHOCYTES NFR BLD AUTO: 27.5 %
MCH RBC QN AUTO: 31.7 PG (ref 26–34)
MCHC RBC AUTO-ENTMCNC: 34.6 G/DL (ref 31–37)
MCV RBC AUTO: 91.5 FL
MONOCYTES # BLD AUTO: 0.7 X10(3) UL (ref 0.1–1)
MONOCYTES NFR BLD AUTO: 8.8 %
NEUTROPHILS # BLD AUTO: 4.77 X10 (3) UL (ref 1.5–7.7)
NEUTROPHILS # BLD AUTO: 4.77 X10(3) UL (ref 1.5–7.7)
NEUTROPHILS NFR BLD AUTO: 59.9 %
OSMOLALITY SERPL CALC.SUM OF ELEC: 301 MOSM/KG (ref 275–295)
PLATELET # BLD AUTO: 167 10(3)UL (ref 150–450)
POTASSIUM SERPL-SCNC: 4.3 MMOL/L (ref 3.5–5.1)
PROT SERPL-MCNC: 6.2 G/DL (ref 6.4–8.2)
RBC # BLD AUTO: 3.98 X10(6)UL
SODIUM SERPL-SCNC: 140 MMOL/L (ref 136–145)
TROPONIN I SERPL HS-MCNC: 25 NG/L
WBC # BLD AUTO: 8 X10(3) UL (ref 4–11)

## 2024-07-31 PROCEDURE — 84484 ASSAY OF TROPONIN QUANT: CPT | Performed by: STUDENT IN AN ORGANIZED HEALTH CARE EDUCATION/TRAINING PROGRAM

## 2024-07-31 PROCEDURE — 93455 CORONARY ART/GRFT ANGIO S&I: CPT | Performed by: INTERNAL MEDICINE

## 2024-07-31 PROCEDURE — 93010 ELECTROCARDIOGRAM REPORT: CPT

## 2024-07-31 PROCEDURE — 99152 MOD SED SAME PHYS/QHP 5/>YRS: CPT | Performed by: INTERNAL MEDICINE

## 2024-07-31 PROCEDURE — 99285 EMERGENCY DEPT VISIT HI MDM: CPT

## 2024-07-31 PROCEDURE — 85730 THROMBOPLASTIN TIME PARTIAL: CPT | Performed by: EMERGENCY MEDICINE

## 2024-07-31 PROCEDURE — 96365 THER/PROPH/DIAG IV INF INIT: CPT

## 2024-07-31 PROCEDURE — 83036 HEMOGLOBIN GLYCOSYLATED A1C: CPT | Performed by: INTERNAL MEDICINE

## 2024-07-31 PROCEDURE — 96375 TX/PRO/DX INJ NEW DRUG ADDON: CPT

## 2024-07-31 PROCEDURE — 71045 X-RAY EXAM CHEST 1 VIEW: CPT | Performed by: STUDENT IN AN ORGANIZED HEALTH CARE EDUCATION/TRAINING PROGRAM

## 2024-07-31 PROCEDURE — 85025 COMPLETE CBC W/AUTO DIFF WBC: CPT | Performed by: STUDENT IN AN ORGANIZED HEALTH CARE EDUCATION/TRAINING PROGRAM

## 2024-07-31 PROCEDURE — 93005 ELECTROCARDIOGRAM TRACING: CPT

## 2024-07-31 PROCEDURE — 80053 COMPREHEN METABOLIC PANEL: CPT | Performed by: STUDENT IN AN ORGANIZED HEALTH CARE EDUCATION/TRAINING PROGRAM

## 2024-07-31 RX ORDER — HEPARIN SODIUM AND DEXTROSE 10000; 5 [USP'U]/100ML; G/100ML
1000 INJECTION INTRAVENOUS ONCE
Status: COMPLETED | OUTPATIENT
Start: 2024-07-31 | End: 2024-08-01

## 2024-07-31 RX ORDER — MORPHINE SULFATE 4 MG/ML
2 INJECTION, SOLUTION INTRAMUSCULAR; INTRAVENOUS EVERY 30 MIN PRN
Status: DISCONTINUED | OUTPATIENT
Start: 2024-07-31 | End: 2024-08-02

## 2024-07-31 RX ORDER — LIDOCAINE HYDROCHLORIDE 10 MG/ML
INJECTION, SOLUTION EPIDURAL; INFILTRATION; INTRACAUDAL; PERINEURAL
Status: COMPLETED
Start: 2024-07-31 | End: 2024-07-31

## 2024-07-31 RX ORDER — HEPARIN SODIUM 5000 [USP'U]/ML
INJECTION, SOLUTION INTRAVENOUS; SUBCUTANEOUS
Status: COMPLETED
Start: 2024-07-31 | End: 2024-07-31

## 2024-07-31 RX ORDER — MAGNESIUM HYDROXIDE/ALUMINUM HYDROXICE/SIMETHICONE 120; 1200; 1200 MG/30ML; MG/30ML; MG/30ML
30 SUSPENSION ORAL ONCE
Status: COMPLETED | OUTPATIENT
Start: 2024-07-31 | End: 2024-07-31

## 2024-07-31 RX ORDER — HEPARIN SODIUM 5000 [USP'U]/ML
INJECTION, SOLUTION INTRAVENOUS; SUBCUTANEOUS
Status: DISCONTINUED
Start: 2024-07-31 | End: 2024-07-31 | Stop reason: WASHOUT

## 2024-07-31 RX ORDER — CLOPIDOGREL BISULFATE 75 MG/1
75 TABLET ORAL ONCE
Status: COMPLETED | OUTPATIENT
Start: 2024-07-31 | End: 2024-07-31

## 2024-07-31 RX ORDER — HEPARIN SODIUM 1000 [USP'U]/ML
5000 INJECTION, SOLUTION INTRAVENOUS; SUBCUTANEOUS ONCE
Status: COMPLETED | OUTPATIENT
Start: 2024-07-31 | End: 2024-07-31

## 2024-07-31 RX ORDER — MIDAZOLAM HYDROCHLORIDE 1 MG/ML
INJECTION INTRAMUSCULAR; INTRAVENOUS
Status: COMPLETED
Start: 2024-07-31 | End: 2024-07-31

## 2024-07-31 RX ORDER — NITROGLYCERIN 0.4 MG/1
0.4 TABLET SUBLINGUAL EVERY 5 MIN PRN
Status: DISCONTINUED | OUTPATIENT
Start: 2024-07-31 | End: 2024-08-02

## 2024-07-31 RX ORDER — LIDOCAINE HYDROCHLORIDE 20 MG/ML
10 SOLUTION OROPHARYNGEAL ONCE
Status: DISCONTINUED | OUTPATIENT
Start: 2024-07-31 | End: 2024-08-02

## 2024-07-31 RX ORDER — IODIXANOL 320 MG/ML
100 INJECTION, SOLUTION INTRAVASCULAR
Status: COMPLETED | OUTPATIENT
Start: 2024-07-31 | End: 2024-07-31

## 2024-07-31 RX ORDER — NITROGLYCERIN 20 MG/100ML
INJECTION INTRAVENOUS
Status: DISCONTINUED | OUTPATIENT
Start: 2024-07-31 | End: 2024-08-01

## 2024-07-31 RX ORDER — ROSUVASTATIN CALCIUM 20 MG/1
20 TABLET, COATED ORAL NIGHTLY
COMMUNITY

## 2024-07-31 RX ORDER — HEPARIN SODIUM AND DEXTROSE 10000; 5 [USP'U]/100ML; G/100ML
INJECTION INTRAVENOUS CONTINUOUS
Status: DISCONTINUED | OUTPATIENT
Start: 2024-08-01 | End: 2024-08-01

## 2024-08-01 LAB
ANION GAP SERPL CALC-SCNC: 5 MMOL/L (ref 0–18)
APTT PPP: 26 SECONDS (ref 23–36)
ATRIAL RATE: 71 BPM
ATRIAL RATE: 76 BPM
ATRIAL RATE: 76 BPM
BASOPHILS # BLD AUTO: 0.03 X10(3) UL (ref 0–0.2)
BASOPHILS NFR BLD AUTO: 0.4 %
BUN BLD-MCNC: 28 MG/DL (ref 9–23)
CALCIUM BLD-MCNC: 9.1 MG/DL (ref 8.7–10.4)
CHLORIDE SERPL-SCNC: 107 MMOL/L (ref 98–112)
CK SERPL-CCNC: 273 U/L
CO2 SERPL-SCNC: 23 MMOL/L (ref 21–32)
CREAT BLD-MCNC: 2.02 MG/DL
EGFRCR SERPLBLD CKD-EPI 2021: 34 ML/MIN/1.73M2 (ref 60–?)
EOSINOPHIL # BLD AUTO: 0.18 X10(3) UL (ref 0–0.7)
EOSINOPHIL NFR BLD AUTO: 2.6 %
ERYTHROCYTE [DISTWIDTH] IN BLOOD BY AUTOMATED COUNT: 13 %
EST. AVERAGE GLUCOSE BLD GHB EST-MCNC: 171 MG/DL (ref 68–126)
GLUCOSE BLD-MCNC: 104 MG/DL (ref 70–99)
GLUCOSE BLD-MCNC: 181 MG/DL (ref 70–99)
GLUCOSE BLD-MCNC: 196 MG/DL (ref 70–99)
GLUCOSE BLD-MCNC: 222 MG/DL (ref 70–99)
GLUCOSE BLD-MCNC: 265 MG/DL (ref 70–99)
GLUCOSE BLD-MCNC: 300 MG/DL (ref 70–99)
GLUCOSE BLD-MCNC: 87 MG/DL (ref 70–99)
HBA1C MFR BLD: 7.6 % (ref ?–5.7)
HCT VFR BLD AUTO: 35.7 %
HGB BLD-MCNC: 12.2 G/DL
IMM GRANULOCYTES # BLD AUTO: 0.02 X10(3) UL (ref 0–1)
IMM GRANULOCYTES NFR BLD: 0.3 %
LYMPHOCYTES # BLD AUTO: 1.52 X10(3) UL (ref 1–4)
LYMPHOCYTES NFR BLD AUTO: 22.3 %
MCH RBC QN AUTO: 31.6 PG (ref 26–34)
MCHC RBC AUTO-ENTMCNC: 34.2 G/DL (ref 31–37)
MCV RBC AUTO: 92.5 FL
MONOCYTES # BLD AUTO: 0.57 X10(3) UL (ref 0.1–1)
MONOCYTES NFR BLD AUTO: 8.3 %
MRSA DNA SPEC QL NAA+PROBE: NEGATIVE
NEUTROPHILS # BLD AUTO: 4.51 X10 (3) UL (ref 1.5–7.7)
NEUTROPHILS # BLD AUTO: 4.51 X10(3) UL (ref 1.5–7.7)
NEUTROPHILS NFR BLD AUTO: 66.1 %
OSMOLALITY SERPL CALC.SUM OF ELEC: 295 MOSM/KG (ref 275–295)
P AXIS: 37 DEGREES
P AXIS: 54 DEGREES
P AXIS: 63 DEGREES
P-R INTERVAL: 144 MS
P-R INTERVAL: 146 MS
P-R INTERVAL: 146 MS
PLATELET # BLD AUTO: 144 10(3)UL (ref 150–450)
POTASSIUM SERPL-SCNC: 4.7 MMOL/L (ref 3.5–5.1)
Q-T INTERVAL: 350 MS
Q-T INTERVAL: 418 MS
Q-T INTERVAL: 420 MS
QRS DURATION: 102 MS
QRS DURATION: 84 MS
QRS DURATION: 88 MS
QTC CALCULATION (BEZET): 393 MS
QTC CALCULATION (BEZET): 454 MS
QTC CALCULATION (BEZET): 472 MS
R AXIS: 26 DEGREES
R AXIS: 31 DEGREES
R AXIS: 8 DEGREES
RBC # BLD AUTO: 3.86 X10(6)UL
SODIUM SERPL-SCNC: 135 MMOL/L (ref 136–145)
T AXIS: 114 DEGREES
T AXIS: 146 DEGREES
T AXIS: 147 DEGREES
TROPONIN I SERPL HS-MCNC: 22 NG/L
TROPONIN I SERPL HS-MCNC: 50 NG/L
VENTRICULAR RATE: 71 BPM
VENTRICULAR RATE: 76 BPM
VENTRICULAR RATE: 76 BPM
WBC # BLD AUTO: 6.8 X10(3) UL (ref 4–11)

## 2024-08-01 PROCEDURE — B2111ZZ FLUOROSCOPY OF MULTIPLE CORONARY ARTERIES USING LOW OSMOLAR CONTRAST: ICD-10-PCS | Performed by: INTERNAL MEDICINE

## 2024-08-01 PROCEDURE — 85025 COMPLETE CBC W/AUTO DIFF WBC: CPT | Performed by: INTERNAL MEDICINE

## 2024-08-01 PROCEDURE — 82550 ASSAY OF CK (CPK): CPT | Performed by: INTERNAL MEDICINE

## 2024-08-01 PROCEDURE — 87641 MR-STAPH DNA AMP PROBE: CPT | Performed by: INTERNAL MEDICINE

## 2024-08-01 PROCEDURE — 82962 GLUCOSE BLOOD TEST: CPT

## 2024-08-01 PROCEDURE — 80048 BASIC METABOLIC PNL TOTAL CA: CPT | Performed by: INTERNAL MEDICINE

## 2024-08-01 PROCEDURE — 84484 ASSAY OF TROPONIN QUANT: CPT | Performed by: INTERNAL MEDICINE

## 2024-08-01 PROCEDURE — 93010 ELECTROCARDIOGRAM REPORT: CPT | Performed by: INTERNAL MEDICINE

## 2024-08-01 PROCEDURE — 85730 THROMBOPLASTIN TIME PARTIAL: CPT | Performed by: INTERNAL MEDICINE

## 2024-08-01 PROCEDURE — 93005 ELECTROCARDIOGRAM TRACING: CPT

## 2024-08-01 PROCEDURE — B2181ZZ FLUOROSCOPY OF LEFT INTERNAL MAMMARY BYPASS GRAFT USING LOW OSMOLAR CONTRAST: ICD-10-PCS | Performed by: INTERNAL MEDICINE

## 2024-08-01 RX ORDER — ASPIRIN 81 MG/1
81 TABLET, CHEWABLE ORAL DAILY
Status: DISCONTINUED | OUTPATIENT
Start: 2024-08-01 | End: 2024-08-02

## 2024-08-01 RX ORDER — CETIRIZINE HYDROCHLORIDE 5 MG/1
5 TABLET ORAL
Status: DISCONTINUED | OUTPATIENT
Start: 2024-08-01 | End: 2024-08-02

## 2024-08-01 RX ORDER — LOSARTAN POTASSIUM 25 MG/1
25 TABLET ORAL AS DIRECTED
COMMUNITY
Start: 2024-03-13 | End: 2024-08-02

## 2024-08-01 RX ORDER — NICOTINE POLACRILEX 4 MG
15 LOZENGE BUCCAL
Status: DISCONTINUED | OUTPATIENT
Start: 2024-08-01 | End: 2024-08-02

## 2024-08-01 RX ORDER — NICOTINE POLACRILEX 4 MG
30 LOZENGE BUCCAL
Status: DISCONTINUED | OUTPATIENT
Start: 2024-08-01 | End: 2024-08-02

## 2024-08-01 RX ORDER — DEXTROSE MONOHYDRATE 25 G/50ML
50 INJECTION, SOLUTION INTRAVENOUS
Status: DISCONTINUED | OUTPATIENT
Start: 2024-08-01 | End: 2024-08-02

## 2024-08-01 RX ORDER — AMLODIPINE BESYLATE 5 MG/1
5 TABLET ORAL 2 TIMES DAILY
Status: DISCONTINUED | OUTPATIENT
Start: 2024-08-01 | End: 2024-08-02

## 2024-08-01 RX ORDER — ROSUVASTATIN CALCIUM 20 MG/1
20 TABLET, COATED ORAL NIGHTLY
Status: DISCONTINUED | OUTPATIENT
Start: 2024-08-01 | End: 2024-08-02

## 2024-08-01 RX ORDER — ISOSORBIDE MONONITRATE 30 MG/1
30 TABLET, EXTENDED RELEASE ORAL DAILY
Status: DISCONTINUED | OUTPATIENT
Start: 2024-08-01 | End: 2024-08-02

## 2024-08-01 RX ORDER — HEPARIN SODIUM 5000 [USP'U]/ML
5000 INJECTION, SOLUTION INTRAVENOUS; SUBCUTANEOUS EVERY 8 HOURS SCHEDULED
Status: DISCONTINUED | OUTPATIENT
Start: 2024-08-01 | End: 2024-08-01

## 2024-08-01 RX ORDER — ACETAMINOPHEN 325 MG/1
650 TABLET ORAL EVERY 6 HOURS PRN
Status: DISCONTINUED | OUTPATIENT
Start: 2024-08-01 | End: 2024-08-02

## 2024-08-01 RX ORDER — CLOPIDOGREL BISULFATE 75 MG/1
75 TABLET ORAL
Status: DISCONTINUED | OUTPATIENT
Start: 2024-08-01 | End: 2024-08-02

## 2024-08-01 RX ORDER — ALLOPURINOL 300 MG/1
300 TABLET ORAL DAILY
Status: DISCONTINUED | OUTPATIENT
Start: 2024-08-01 | End: 2024-08-02

## 2024-08-01 RX ORDER — METOPROLOL SUCCINATE 25 MG/1
25 TABLET, EXTENDED RELEASE ORAL
Status: DISCONTINUED | OUTPATIENT
Start: 2024-08-01 | End: 2024-08-02

## 2024-08-01 RX ORDER — HEPARIN SODIUM 5000 [USP'U]/ML
5000 INJECTION, SOLUTION INTRAVENOUS; SUBCUTANEOUS EVERY 12 HOURS SCHEDULED
Status: DISCONTINUED | OUTPATIENT
Start: 2024-08-01 | End: 2024-08-02

## 2024-08-01 RX ORDER — SODIUM BICARBONATE 325 MG/1
650 TABLET ORAL 3 TIMES DAILY
Status: DISCONTINUED | OUTPATIENT
Start: 2024-08-01 | End: 2024-08-02

## 2024-08-01 RX ORDER — DOCUSATE SODIUM 100 MG/1
100 CAPSULE, LIQUID FILLED ORAL 2 TIMES DAILY
Status: DISCONTINUED | OUTPATIENT
Start: 2024-08-01 | End: 2024-08-02

## 2024-08-01 RX ORDER — BISACODYL 10 MG
10 SUPPOSITORY, RECTAL RECTAL
Status: DISCONTINUED | OUTPATIENT
Start: 2024-08-01 | End: 2024-08-02

## 2024-08-01 RX ORDER — RISPERIDONE 0.5 MG/1
0.5 TABLET ORAL 2 TIMES DAILY
Status: DISCONTINUED | OUTPATIENT
Start: 2024-08-01 | End: 2024-08-02

## 2024-08-01 RX ORDER — POLYETHYLENE GLYCOL 3350 17 G/17G
17 POWDER, FOR SOLUTION ORAL DAILY PRN
Status: DISCONTINUED | OUTPATIENT
Start: 2024-08-01 | End: 2024-08-02

## 2024-08-01 RX ORDER — CLONAZEPAM 0.5 MG/1
1 TABLET ORAL NIGHTLY
Status: DISCONTINUED | OUTPATIENT
Start: 2024-08-01 | End: 2024-08-02

## 2024-08-01 RX ORDER — ONDANSETRON 2 MG/ML
4 INJECTION INTRAMUSCULAR; INTRAVENOUS EVERY 6 HOURS PRN
Status: DISCONTINUED | OUTPATIENT
Start: 2024-08-01 | End: 2024-08-02

## 2024-08-01 RX ORDER — ALBUTEROL SULFATE 90 UG/1
2 AEROSOL, METERED RESPIRATORY (INHALATION) EVERY 4 HOURS PRN
Status: DISCONTINUED | OUTPATIENT
Start: 2024-08-01 | End: 2024-08-02

## 2024-08-01 NOTE — PROGRESS NOTES
[facility]  Progress Note    Samy Wheeler Patient Status:  Observation    1950 MRN [id number]   Location [facility] 6NE-A Attending Antonella Darden MD   Hosp Day # 0 PCP Shane Stratton MD     Subjective:   No further chest pain.  No shortness of breath.  No focal cardiovascular complaints reported.  No complaints at R femoral cath site.    Objective:  Vitals:    24 0500 24 0600 24 0700 24 0800   BP: 134/83 152/77 138/73 140/67   BP Location:    Right arm   Pulse: 72 70 75 75   Resp: 20 16 19 15   Temp:    97.7 °F (36.5 °C)   TempSrc:    Temporal   SpO2: 93% 95% 96% 91%   Weight:       Height:           Temp (24hrs), Av °F (36.7 °C), Min:97.7 °F (36.5 °C), Max:98.3 °F (36.8 °C)      Medications:   Scheduled:    allopurinol  300 mg Oral Daily    amLODIPine  5 mg Oral BID    aspirin  81 mg Oral Daily    clonazePAM  1 mg Oral Nightly    clopidogrel  75 mg Oral Daily    risperiDONE  0.5 mg Oral BID    rosuvastatin  20 mg Oral Nightly    sodium bicarbonate  650 mg Oral TID    insulin degludec  42 Units Subcutaneous Nightly    insulin aspart  2-10 Units Subcutaneous TID AC and HS    Lidocaine Viscous HCl  10 mL Mouth/Throat Once       Continuous Infusion:    nitroGLYCERIN in dextrose 5% 20 mcg/min (24 0600)    continuous dose heparin         PRN Medications:     albuterol    cetirizine    glucose **OR** glucose **OR** glucose-vitamin C **OR** dextrose **OR** glucose **OR** glucose **OR** glucose-vitamin C    acetaminophen    ondansetron    nitroglycerin    morphINE    Intake/Output:     Intake/Output Summary (Last 24 hours) at 2024 0834  Last data filed at 2024 0647  Gross per 24 hour   Intake 968.5 ml   Output 525 ml   Net 443.5 ml       Wt Readings from Last 3 Encounters:   24 210 lb (95.3 kg)   10/09/23 207 lb 0.2 oz (93.9 kg)   22 208 lb (94.3 kg)       Allergies:  Allergies   Allergen Reactions    Amiodarone OTHER (SEE COMMENTS)     Extreme  mentation changes    Ativan [Lorazepam] JITTERY     Intolerance, made movements worse. Tolerates klonopin    Gabapentin OTHER (SEE COMMENTS)     weakness    Haldol [Haloperidol] JITTERY     Made movements worse    Adhesive Tape RASH    Latex RASH       Physical Exam:   General:  Well-developed / Well-nourished.  No acute distress.  HEENT:  Normocephalic.  Atraumatic.  No icterus.  Neck:  There is no jugular venous distention.   Cardiovascular:  Cardiovascular examination demonstrates a regular rate and rhythm.  There is normal S1, S2.  There is no S3 or S4.  There are no rubs or gallops.  Grade 2/6 LUIZ LSB.  No click is appreciated.  PMI is nondisplaced with a normal apical impulse.    Pulmonary:  Lungs are clear to auscultation bilaterally.  There are no focal rales, rhonchi, or wheezes.  Good air movement is noted throughout both lung fields.   Abdomen:  The abdomen is soft, non-distended, and non-tender.  Bowel sounds are present and normoactive.  No organomegaly is appreciated.  Extremities:  Extremities do not demonstrate any evidence of peripheral edema.   No cyanosis or clubbing of the digits is appreciated.  Neurologic:  Alert and oriented.  Normal affect.  Integument:  No visible rashes are appreciated.      Laboratory/Data:   Recent Labs   Lab 07/31/24 2125   GLU 81   BUN 46*   CREATSERUM 2.42*   CA 9.0   ALB 3.6      K 4.3      CO2 28.0   ALKPHO 81   AST 13*   ALT 26   BILT 0.4   TP 6.2*       Recent Labs   Lab 07/31/24 2125   RBC 3.98   HGB 12.6*   HCT 36.4*   MCV 91.5   MCH 31.7   MCHC 34.6   RDW 13.1   NEPRELIM 4.77   WBC 8.0   .0       No results for input(s): \"PTP\", \"INR\" in the last 168 hours.    No results for input(s): \"TROP\", \"CK\" in the last 168 hours.      Tele:     Diagnostics:    Cath:   Findings:  1. Left main: No stenosis   2. LAD: Ostial .   3. LCX: High OM1/ramus with moderate diffuse proximal plaquing ~ 60-70%. There is a tiny branch off this that is subtotally  occluded which is unchanged. The LCX proper then supplies a small OM2 and a medium sized posterolateral branch, with mild plaque < 30%.   4. RCA: Anterior takeoff (engage with AL1). Dominant to the PDA. Proximal stents widely patent with no significant ISR. Rest of vessel to PDA is smooth without stenosis. RPL smaller with mild <50% plaque.  5. SVG to diagonal known occlusion, not studied.  6. LIMA to mid LAD: very tortuous, widely patent, touches down mid LAD. Downstream long LAD stents are widely patent without significant ISR. Stents span a distal diagonal branch that is small but patent without stenosis. Proximal to the anastomosis, the LAD has a short segment of ~ 90% stenosis which backfills into a small diagonal branch and a septal . This branch is < 2 mm in diameter. The SVG backfills to a stump in the mid portion of the diagonal.    7. Right CFA: Non obstructive plaque. S/p Perclose Proglide.     Conclusions / Recommendations:  There is MARCOS 3 flow in all major territories. The closest lesion to a culprit would be the proximal LAD, upstream from the LIMA anastomosis, which backfills a small diagonal and septal . It is feasible to treat the upstream LAD via the LIMA, however it is very tortuous and there is risk for compromising the LIMA. It is also feasible to treat the LAD  antegrade via the left main, though this would be a complex undertaking, likely requiring rotational atherectomy of the LAD, stenting into the left main and would risk compromising the large LCX and ramus systems. Another consideration is the patient's CKD 4 with Cr ~ 2.5. As the patient is currently completely CP free, without ST segment elevations seen, MARCOS 3 flow, and hemodynamically and electrically stable, I did not think proceeding with PCI would be in his best interest.   We will keep him on nitroglycerin and beta blockers overnight and medically manage his symptoms. Consider heparin if R groin is stable  in the morning, particularly if his troponin rises or if he has more symptoms.   Continue DAPT, statin  Because of tortuosity in the left subclavian, I did not perform an angiogram as the catheter tip was always up against the wall. There was not a significant pressure gradient when removing the REYNALDO catheter into the aorta, but could consider a CTA or ultrasound to evaluate for L subclavian stenosis.     Assessment:    1.  Acute chest pain  -ECG with dynamic ST changes  -CAD as above - no clear specific \"culprit\"vessel  -Medical Rx  -Negative CV isoenzymes X 2 despite hours of Sx and ECG changes  2.  S/P Bioprosthetic Aortic valve replacement 1/11.   3.  S/P CABG x 2V 1/11.  4.  CAD                 -S/P GRANT to RCA 5/7/18                -S/P GRANT to LAD 9/21                -Stable anatomy 10/23 after abn Stress 9/23 - false +    -Relatively stable anatomy 7/24 cath, as above  5.  Hypertension   6.  Hyperlipidemia - on statin.    7.  Diabetes mellitus.  8.  Questionable family history of premature vascular disease.    9.  Transient post-op AFib - no documented recurrence.  No recurrence documented on LINQ to date.  LINQ removed.  10.  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  ALVA without focal source.  11.  Depression.  12.  Hemiballismus - Hemichorea syndrome  13.  PFO on ALVA  14.  VT on past LINQ interrogation with NL EF and negative cath thereafter.  15.  CRI - stable      Plan:    DAPT   Stop IV NTG   Imdur   BB   Statin   Norvasc   Repeat CV iso X 1 more   L subclavian US   Transfer to tele   Tele monitor   Medical Rx for current anatomy at this time.     F/U CARMEN Plunkett MD  8/1/2024  8:34 AM

## 2024-08-01 NOTE — PROCEDURES
OPERATIVE REPORT - DIAGNOSTIC CARDIAC CATHETERIZATION    Date of Procedure: 8/1/2024     Performing Physician: Tony Calderon MD    Indication: This is a 73 year old male who presents with CP and ST elevations in V1-V2, here for coronary and CABG angiography to evaluate for obstructive CAD.     Pre-Procedure Diagnosis:   STEMI  CAD h/o 2V CABG and prior stents  H/o bioAVR     Post-Procedure Diagnosis:  Same as pre    Procedures performed:   1. Selective bilateral coronary and CABG angiography  2. Moderate sedation  3. Ultrasound guided access of right common femoral artery  4. Perclose Proglide vascular closure of R CFA    Findings:  1. Left main: No stenosis   2. LAD: Ostial .   3. LCX: High OM1/ramus with moderate diffuse proximal plaquing ~ 60-70%. There is a tiny branch off this that is subtotally occluded which is unchanged. The LCX proper then supplies a small OM2 and a medium sized posterolateral branch, with mild plaque < 30%.   4. RCA: Anterior takeoff (engage with AL1). Dominant to the PDA. Proximal stents widely patent with no significant ISR. Rest of vessel to PDA is smooth without stenosis. RPL smaller with mild <50% plaque.  5. SVG to diagonal known occlusion, not studied.  6. LIMA to mid LAD: very tortuous, widely patent, touches down mid LAD. Downstream long LAD stents are widely patent without significant ISR. Stents span a distal diagonal branch that is small but patent without stenosis. Proximal to the anastomosis, the LAD has a short segment of ~ 90% stenosis which backfills into a small diagonal branch and a septal . This branch is < 2 mm in diameter. The SVG backfills to a stump in the mid portion of the diagonal.    7. Right CFA: Non obstructive plaque. S/p Perclose Proglide.    Conclusions / Recommendations:  There is MARCOS 3 flow in all major territories. The closest lesion to a culprit would be the proximal LAD, upstream from the LIMA anastomosis, which backfills a small  diagonal and septal . It is feasible to treat the upstream LAD via the LIMA, however it is very tortuous and there is risk for compromising the LIMA. It is also feasible to treat the LAD  antegrade via the left main, though this would be a complex undertaking, likely requiring rotational atherectomy of the LAD, stenting into the left main and would risk compromising the large LCX and ramus systems. Another consideration is the patient's CKD 4 with Cr ~ 2.5. As the patient is currently completely CP free, without ST segment elevations seen, MARCOS 3 flow, and hemodynamically and electrically stable, I did not think proceeding with PCI would be in his best interest.   We will keep him on nitroglycerin and beta blockers overnight and medically manage his symptoms. Consider heparin if R groin is stable in the morning, particularly if his troponin rises or if he has more symptoms.   Continue DAPT, statin  Because of tortuosity in the left subclavian, I did not perform an angiogram as the catheter tip was always up against the wall. There was not a significant pressure gradient when removing the REYNALDO catheter into the aorta, but could consider a CTA or ultrasound to evaluate for L subclavian stenosis.     --------------------    Description of Procedure: Informed consent was obtained from the patient and any accompanying family. The risks and benefits of the procedure were reviewed in detail including but not limited to the risk of myocardial infarction, stroke, renal failure, bleeding, and death. After a thorough discussion of these risks and benefits, the patient agreed to proceed and signed an informed consent document. The patient was brought to the cardiac catheterization laboratory in the fasting state. Moderate sedation was employed using a total of IV Versed 2 mg and IV Fentanyl 50 mcg in divided doses.  I directly observed the patient from 1138 to 1157, and an independent trained observer was present  and assisted in the monitoring of the patient's level of consciousness and physiological status, heart rate, blood pressure, oximetry, and rhythm. All operators present for the case performed standard pre-procedural prep including hand washing, sterile gloves, gown, mask, and cap. All aspects of the maximum sterile barrier technique were followed. A preprocedural time out was performed with all physicians, technologists, and nurses involved with the procedure. 1% lidocaine was infiltrated subcutaneously in the right groin for local anesthesia. Ultrasound guided access was obtained in the right common femoral artery with a 6F [place] sheath (25 cm because of resistance at the aortoiliac junction, likely from some degree of stenosis) using the Seldinger technique and a micropuncture needle. Pulsatile bright red blood return was seen. We needed a Glidewire to get up the aorta. The sheath was flushed gently and with a wire left in, angiogram of the ileofemoral arteries was taken, showing the arteriotomy site to be above the bifurcation of the SFA and below the inferior epigastric artery. Selective bilateral coronary angiography was performed using 6F JL4 and AL1 catheters, advanced over a J tipped wire through the arterial sheath and engaged in the left and right coronary arteries respectively. Standard angiographic views were taken in orthogonal projections. All catheter exchanges were performed over a wire. A 6F REYNALDO was used to engage the left subclavian artery. It was very tortuous and I used the Glidewire to traverse this. I engaged the LIMA with the REYNALDO and angiography performed. The catheter was then removed. At the conclusion of the procedure, all catheters and wires were removed over a wire. The arterial sheath was removed and hemostasis achieved with a Perclose Proglide closure device, deployed in the usual fashion. There was no bleeding or hematoma. The distal pulses were intact following this.  The patient  tolerated the procedure well without complication.    EBL <10 ml  Total contrast 55 ml  See procedure log for total fluoro time and radiation dose.     Tony Calderon MD  Interventional Cardiology  [facility]  Office: [phone]

## 2024-08-01 NOTE — RESPIRATORY THERAPY NOTE
PATIENT HAS HISTORY OF ANNALISE. PATIENT REFUSES TO USE CPAP DURING HOSPITAL STAY. ANNALISE PROTOCOL IN CHART.

## 2024-08-01 NOTE — CONSULTS
[facility] Cardiology  Consultation Note      Samy Wheeler Patient Status:  Observation    1950 MRN [id number]   Location [facility] [place] 6NE-A Attending Robert Payton MD   Hosp Day # 0 PCP Shane Stratton MD     Reason for consult: STEMI    Primary cardiologist: Antelmo Plunkett MD     History of Present Illness:  Samy Wheeler is a 73 year old male with CAD and h/o 2V CABG with bioAVR , PCI to RCA ,   PCI to mid LAD  (downstream from patent LIMA, but via native LAD ostial occlusion), who presented to [facility] on 2024 with chest burning, beginning about 2 hours PTA. Initial troponin negative and EKG non acute. Had persistent CP, so repeat EKG performed showing dynamic 1-2 mm RADHA V1-V2. There were chronic TWI laterally but no reciprocal ST depressions. Cath lab called in emergently at that point. At time of my interview, symptoms were improved on nitroglycerin drip, from 8/10 down to 3/10. Had some palpitations during CP. There are no reports of dyspnea, leg swelling, orthopnea, PND, lightheadedness, syncope, claudication, or any outward signs of bleeding.          Medications:  Current Facility-Administered Medications   Medication Dose Route Frequency    albuterol (Ventolin HFA) 108 (90 Base) MCG/ACT inhaler 2 puff  2 puff Inhalation Q4H PRN    allopurinol (Zyloprim) tab 300 mg  300 mg Oral Daily    amLODIPine (Norvasc) tab 5 mg  5 mg Oral BID    aspirin tab 81 mg  81 mg Oral Daily    cetirizine (ZyrTEC) tab 10 mg  10 mg Oral Daily PRN    clonazePAM (KlonoPIN) tab 1 mg  1 mg Oral Nightly    clopidogrel (Plavix) tab 75 mg  75 mg Oral Daily    risperiDONE (RisperDAL) tab 0.5 mg  0.5 mg Oral BID    rosuvastatin (Crestor) tab 20 mg  20 mg Oral Nightly    sodium bicarbonate tab 650 mg  650 mg Oral TID    Insulin Glargine (1 Unit Dial) SOPN 42 Units  42 Units Subcutaneous Nightly    glucose (Dex4) 15 GM/59ML oral liquid 15 g  15 g Oral Q15 Min PRN    Or    glucose  (Glutose) 40% oral gel 15 g  15 g Oral Q15 Min PRN    Or    glucose-vitamin C (Dex-4) chewable tab 4 tablet  4 tablet Oral Q15 Min PRN    Or    dextrose 50% injection 50 mL  50 mL Intravenous Q15 Min PRN    Or    glucose (Dex4) 15 GM/59ML oral liquid 30 g  30 g Oral Q15 Min PRN    Or    glucose (Glutose) 40% oral gel 30 g  30 g Oral Q15 Min PRN    Or    glucose-vitamin C (Dex-4) chewable tab 8 tablet  8 tablet Oral Q15 Min PRN    insulin aspart (NovoLOG) 100 Units/mL FlexPen 2-10 Units  2-10 Units Subcutaneous TID AC and HS    acetaminophen (Tylenol) tab 650 mg  650 mg Oral Q6H PRN    ondansetron (Zofran) 4 MG/2ML injection 4 mg  4 mg Intravenous Q6H PRN    heparin (Porcine) 5000 UNIT/ML injection 5,000 Units  5,000 Units Subcutaneous Q8H YAMILKA    nitroglycerin (Nitrostat) SL tab 0.4 mg  0.4 mg Sublingual Q5 Min PRN    Lidocaine Viscous HCl (XYLOCAINE) 2 % mouth solution 10 mL  10 mL Mouth/Throat Once    morphINE PF 4 MG/ML injection 2 mg  2 mg Intravenous Q30 Min PRN    nitroGLYCERIN in dextrose 5% 50 mg/250mL infusion premix  5-20 mcg/min Intravenous Titrated    heparin (Porcine) 76858 units/250mL infusion ED INITIAL DOSE  1,000 Units/hr Intravenous Once    heparin (Porcine) 39142 units/250mL infusion ACS/AFIB CONTINUOUS  200-3,000 Units/hr Intravenous Continuous       Past Medical History:    Aortic valve disorders    Bio AVR and CABG X 2 V.S/P CABG x 2V 1/11    Atherosclerosis of coronary artery    Back problem    Congenital anomaly of heart (HCC)    Coronary atherosclerosis    PCI with Casimiro to prox and mid RCA.  99% occlusion of mRCA had been found    Depression    Diabetes (HCC)    Goiter    bx 2008,     Hemiballism    ? related to severe hyperglycemia, CVA. following Neurology at [facility].    HYPERLIPIDEMIA    HYPERTENSION    KIDNEY STONE    uric acid; takes gout medication for stones    Muscle weakness    Osteoarthritis    Peyronie's disease    2009    Shortness of breath    Sleep apnea    patient  wanted to trial, never did. does not use cpap.    Stroke (HCC)       Past Surgical History:   Procedure Laterality Date    Aortic hrt valve w/card byp      Back surgery      lumbar laminectomy 95    Bypass surgery  2011    CABG X 2 with LIMA to LAD, SVG to diag    Cabg      Cath percutaneous  transluminal coronary angioplasty      Colonoscopy  12/15/2011 - MARIA ELENA Win    hemorrhoids, repeat .    Colonoscopy  12/15/2011    Colonoscopy,diagnostic  12/15/2011    Performed by OZIEL WIN at [facility] SURGICAL [place], [facility]    Knee replacement surgery      right    Knee replacement surgery      left    Other surgical history      right shoulder aa/lysis of adhesions    Other surgical history  2012    left sh aa.debride rct and labrum with manip    Spine surgery procedure unlisted      Valve repair      Valve replacement  2011    23mm Cresencio-Clark Magna Ease Bovine        Family History  family history includes Cancer in his mother; Heart Disorder in his brother and father.    Social History   reports that he has never smoked. He has never used smokeless tobacco. He reports that he does not drink alcohol and does not use drugs.     Allergies  Allergies   Allergen Reactions    Ativan [Lorazepam] JITTERY     Intolerance, made movements worse. Tolerates klonopin    Gabapentin OTHER (SEE COMMENTS)     weakness    Haldol [Haloperidol] JITTERY     Made movements worse    Adhesive Tape RASH    Latex RASH       Review of Systems:  As per HPI, otherwise 10 point ROS is negative in detail.    Physical Exam:  Blood pressure 133/77, pulse 80, temperature 98.3 °F (36.8 °C), temperature source Oral, resp. rate 20, height 72\", weight 210 lb (95.3 kg), SpO2 99%.  Temp (24hrs), Av.3 °F (36.8 °C), Min:98.3 °F (36.8 °C), Max:98.3 °F (36.8 °C)    Wt Readings from Last 3 Encounters:   24 210 lb (95.3 kg)   10/09/23 207 lb 0.2 oz (93.9 kg)   22 208 lb (94.3 kg)       General: Awake and alert; in no  acute distress  HEENT: Extraocular movements are intact; sclerae are anicteric; scalp is atraumatic  Neck: Supple; no JVD; no carotid bruits  Cardiac: Regular rate and regular rhythm; normal S1 and S2, no murmurs, rubs, or gallops are appreciated  Lungs: Clear to auscultation bilaterally; no accessory muscle use is noted, no wheezes, rhonci or rales  Abdomen: Soft, non-distended, non-tender; bowel sounds are normoactive  Extremities: Warm, no edema, clubbing or cyanosis; moves all 4 extremities normally, distal pulses intact and equal  Psychiatric: Normal mood and affect; answers questions appropriately  Dermatologic: No rashes; normal skin turgor    Diagnostic testing:    Labs:   Lab Results   Component Value Date    PT 13.4 04/26/2013    PT 13.4 04/25/2013    INR 1.05 10/09/2023    INR 1.0 09/10/2021        Lab Results   Component Value Date    WBC 8.0 07/31/2024    HGB 12.6 07/31/2024    HCT 36.4 07/31/2024    .0 07/31/2024    CREATSERUM 2.42 07/31/2024    BUN 46 07/31/2024     07/31/2024    K 4.3 07/31/2024     07/31/2024    CO2 28.0 07/31/2024    GLU 81 07/31/2024    CA 9.0 07/31/2024    ALB 3.6 07/31/2024    ALKPHO 81 07/31/2024    BILT 0.4 07/31/2024    TP 6.2 07/31/2024    AST 13 07/31/2024    ALT 26 07/31/2024    PTT 24.6 07/31/2024       Cardiac diagnostics:    EKG 8/1/2024 (22:20)  NSR, 1-2mm RADHA V1-V2, TWI lateral leads    EKG 8/1/2024 (21:22)  Normal sinus rhythm  Nonspecific T wave abnormality     Echo 5/6/24  1. Left ventricle: The cavity size is normal. Wall thickness is mildly      increased. Systolic function is normal by visual assessment. The      estimated ejection fraction is 50-55%. Wall motion is normal; there are      no regional wall motion abnormalities. Grade I diastolic dysfunction.   2. Ventricular septum: Thickness is mildly increased.   3. Aortic valve: A prosthetic device is present and functioning normally.      The prosthesis has a normal range of motion. The  sewing ring appears      normal, has no rocking motion, and shows no evidence of dehiscence. The      peak systolic velocity is 2.6m/sec. The mean systolic gradient is 9mm Hg.      The peak systolic gradient is 27mm Hg.   4. Aortic root: The root is mildly dilated. Mesures at 4 to 4.1 cm.   5. Mitral valve: There is mild regurgitation.   6. Left atrium: The atrium is mildly dilated.   7. Right ventricle: The cavity size is normal. Wall thickness is normal.      Systolic function is normal by visual assessment. Estimation of the right      ventricular systolic pressure is within the normal range.   8. Tricuspid valve: There is mild regurgitation.   9. Pericardium, extracardiac: There is no significant pericardial effusion.     Cath 10/22/23  ANGIOGRAPHIC FINDINGS:    1.     The left main has mild disease.  2.     The LAD is occluded at the ostium and fills via the REYNALDO graft.  3.     The circumflex is patent into 2 obtuse marginal branches.  There is mild to moderate disease in the circumflex system, but no significant disease identified.    4.     Right coronary is a large dominant vessel with an anterior takeoff that we needed an AL1 in order to cannulate.  There is moderate diffuse disease noted in the right coronary system.  5.     LIMA to LAD is a widely patent arterial conduit to a moderate caliber LAD which has a long stented segment in the mid into the distal vessel.  There is no significant in-stent restenosis.  Overall, there is only mild, to at most moderate, disease.  Distal to the stented segment, there is a kink in the vessel as it is quite tortuous and a moderate plaque which is also noted diffusely, but no obstructive lesions are identified.:    Cath PCI 9/17/21  CONCLUSION: PCI of the LAD via the  of the proximal LAD utilizing a dual injection technique. We had an excellent result. The side branches are all intact, and patient tolerated the procedure well. He will need dual antiplatelet therapy for  at least 1 year given the stent length. One might consider even longer therapy. The patient will follow with Dr. Antelmo Plunkett for ongoing evaluation and will return to the telemetry wayne for now and likely receive IV hydration overnight.     Impression:  73 year old male presenting with CP and dynamic EKG changes with ST elevations V1-V2     1. S/P Bioprosthetic Aortic valve replacement 1/11. Stable on Echo 12/22  2. S/P CABG x 2V 1/11.  3. CAD   -S/P GRANT to RCA 5/7/18  -S/P GRANT to LAD 9/21  -Stable anatomy 10/23 after abn Stress 9/23 - false +  4. Hypertension - controlled / mild permissive HTN given episodes which raise concern for transient hypotension after showering.  5. Hyperlipidemia - on statin.   6. Diabetes mellitus.  7. Questionable family history of premature vascular disease.   8. Transient post-op AFib - no documented recurrence. No recurrence documented on LINQ to date. LINQ removed.  9. Echo with NL LV function.  10. 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 ALVA without focal source.  11.Depression.  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 PFO on ALVA  14. VT on past LINQ interrogation with NL EF and negative cath thereafter.  15. CRI - stable  16. Stress Cardiolite with mid LAD ischemia 9/23.  17. LE discomfort - ?Neuropathy. ?Claudication  18. Dyspnea - sees pulmonary. Reports NL PFTs / pulmonary assessment.     Recommendations:  Recommend emergent cath. R/B discussed and he agrees. Further recs to follow.  He has received ASA 81 x 4 and his usual plavix.      Informed consent:  Patient seen and examined independently. H and P reviewed. No changes in H and P. Risks and benefits of procedure were discussed with patient. Airway examined.  Patient is ASA class 4 and Mallampati class 2. Pt is appropriate for conscious sedation. No history of difficult airway. The risks, benefits, indications and alternatives of left heart catheterization and coronary angigoraphy with possible percutaneous coronary  intervention were discussed. The risks include, but are not limited to: bleeding, anemia requiring blood transfusion, allergic reaction, hypotension, infection, vascular injury, injury to upper or lower extremity requiring endovascular or open surgical repair,  kidney failure, stroke, cardiac or pulmonary artery perforation, pericardial effusion and tamponade requiring pericardiocentesis, myocardial infarction (heart attack), respiratory failure needing intubation, cardiac arrest, need for emergent surgery, and death. Benefits of the procedure include: symptomatic improvement, diagnosis of coronary artery disease or other forms of heart disease and possibly prevention of myocardial infarction. Alternatives to the procedure include: not performing cardiac catheterization, treatment with medications only, and observation. The patient and any accompanying family have considered the above, all questions have been answered to the best of my ability to their satisfaction, and have decided to proceed with the procedure. Appropriate candidate for Sedation/Analgesia: Yes. Plan for Sedation reviewed: Yes. Explained Anesthesia options and attendant risks, and have determined patient is an appropriate candidate. Yes. Consent for Sedation obtained: Yes. Patient reevaluated immediately prior to Sedation/Analgesia: Yes.     Thank you for allowing our practice to participate in the care of your patient. Please do not hesitate to contact me if you have any questions.    Tony Calderon MD  Interventional Cardiology  [facility]  Office: [phone]    8/1/2024  12:28 AM    Critical care time: 35 min

## 2024-08-01 NOTE — ED PROVIDER NOTES
Patient Seen in: [facility] [place]      History     Chief Complaint   Patient presents with    Chest Pain Angina     Stated Complaint: chest pain, dizziness    Subjective:   HPI    Patient with a history of diabetes, hypertension, previous CVA and coronary artery disease on aspirin and Plavix.  Patient's wife notes that he was recently started on Ozempic 3 weeks ago.  Patient complains of discomfort in his chest at its worst 6-7/10 that started about 2 hours ago.  It is currently 5 out of 10.  Patient describes a tight sensation in his chest without radiation to the jaw, shoulder, or through into his back.  He has some chronic shortness of breath that is no worse.  No sweatiness.  No nausea.  He feels a little dizzy.  Patient notes that this is not exactly what he felt with his previous presentation of coronary artery disease.  He took his aspirin today but takes his Plavix at night.  No recent high fever or shaking chills.  No cold or cough.  No vomiting or diarrhea.      Objective:   Past Medical History:    Aortic valve disorders    Bio AVR and CABG X 2 V.S/P CABG x 2V 1/11    Atherosclerosis of coronary artery    Back problem    Congenital anomaly of heart (HCC)    Coronary atherosclerosis    PCI with Casimiro to prox and mid RCA.  99% occlusion of mRCA had been found    Depression    Diabetes (HCC)    Goiter    bx 2008,     Hemiballism    ? related to severe hyperglycemia, CVA. following Neurology at [facility].    HYPERLIPIDEMIA    HYPERTENSION    KIDNEY STONE    uric acid; takes gout medication for stones    Muscle weakness    Osteoarthritis    Peyronie's disease    2009    Shortness of breath    Sleep apnea    patient wanted to trial, never did. does not use cpap.    Stroke (HCC)              Past Surgical History:   Procedure Laterality Date    Aortic hrt valve w/card byp  2011    Back surgery      lumbar laminectomy 95    Bypass surgery  01/25/2011    CABG X 2 with LIMA to  LAD, SVG to diag    Cabg      Cath percutaneous  transluminal coronary angioplasty      Colonoscopy  12/15/2011 - MARIA ELENA Win    hemorrhoids, repeat 2021.    Colonoscopy  12/15/2011    Colonoscopy,diagnostic  12/15/2011    Performed by OZIEL WIN at [facility]GICAL [facility]    Knee replacement surgery  2007    right    Knee replacement surgery  2008    left    Other surgical history      right shoulder aa/lysis of adhesions    Other surgical history  11/27/2012    left sh aa.debride rct and labrum with manip    Spine surgery procedure unlisted      Valve repair      Valve replacement  01/25/2011    23mm Cresencio-Clark Magna Ease Bovine                 Social History     Socioeconomic History    Marital status:     Number of children: 2   Occupational History    Occupation: retired   Tobacco Use    Smoking status: Never    Smokeless tobacco: Never   Vaping Use    Vaping status: Never Used   Substance and Sexual Activity    Alcohol use: No    Drug use: No     Social Determinants of Health     Food Insecurity: No Food Insecurity (10/9/2023)    Food Insecurity     Food Insecurity: Never true   Transportation Needs: No Transportation Needs (10/9/2023)    Transportation Needs     Lack of Transportation: No   Housing Stability: Low Risk  (10/9/2023)    Housing Stability     Housing Instability: No              Review of Systems    Positive for stated Chief Complaint: Chest Pain Angina    Other systems are as noted in HPI.  Constitutional and vital signs reviewed.      All other systems reviewed and negative except as noted above.    Physical Exam     ED Triage Vitals [07/31/24 2125]   BP (!) 161/74   Pulse 79   Resp 19   Temp 98.3 °F (36.8 °C)   Temp src Oral   SpO2 99 %   O2 Device None (Room air)       Current Vitals:   Vital Signs  BP: 133/77  Pulse: 80  Resp: 20  Temp: 98.3 °F (36.8 °C)  Temp src: Oral    Oxygen Therapy  SpO2: 99 %  O2 Device: Nasal cannula  O2 Flow Rate (L/min): 6 L/min            Physical  Exam  General: The patient is awake, alert, conversant.   Eyes: sclera white, conjunctiva pink and moist.  Lids and lashes are normal.  Neck: With no JVD  Lungs: Clear to auscultation bilaterally.  No rhonchi or rales.  Heart: Normal S1 and S2, without murmur.  Distal pulses are strong and symmetric.  Abdomen: Soft, nondistended.  Completely nontender even in the epigastrium right upper quadrant  Extremities: Unremarkable.  Calves nonswollen, symmetric, nontender.  No pedal edema.  Skin: Not particularly pale  Neurologic:  Mental status as above.  Patient moves all extremities with good strength and coordination.           ED Course     Labs Reviewed   COMP METABOLIC PANEL (14) - Abnormal; Notable for the following components:       Result Value    BUN 46 (*)     Creatinine 2.42 (*)     Calculated Osmolality 301 (*)     eGFR-Cr 28 (*)     AST 13 (*)     Total Protein 6.2 (*)     Globulin  2.6 (*)     All other components within normal limits   CBC W/ DIFFERENTIAL - Abnormal; Notable for the following components:    HGB 12.6 (*)     HCT 36.4 (*)     All other components within normal limits   TROPONIN I HIGH SENSITIVITY - Normal   PTT, ACTIVATED - Normal   CBC WITH DIFFERENTIAL WITH PLATELET    Narrative:     The following orders were created for panel order CBC With Differential With Platelet.  Procedure                               Abnormality         Status                     ---------                               -----------         ------                     CBC W/ DIFFERENTIAL[[id number]]          Abnormal            Final result                 Please view results for these tests on the individual orders.   RAINBOW DRAW LAVENDER   RAINBOW DRAW LIGHT GREEN   RAINBOW DRAW BLUE     EKG    Rate, intervals and axes as noted on EKG Report.  Rate: 76 bpm  Rhythm: Sinus Rhythm  Reading: Sinus rhythm with some flattened T waves and T wave inversions in the lateral leads.  There is also just less than 1 mm ST  elevation in V1 but there appears to be some elevation or J-point elevation on the previous EKG from October.  The T wave inversions in the lead I and aVL are new from previous EKG                  Cardiac cath from 2023  ANGIOGRAPHIC FINDINGS:    1.     The left main has mild disease.  2.     The LAD is occluded at the ostium and fills via the REYNALDO graft.  3.     The circumflex is patent into 2 obtuse marginal branches.  There is mild to moderate disease in the circumflex system, but no significant disease identified.    4.     Right coronary is a large dominant vessel with an anterior takeoff that we needed an AL1 in order to cannulate.  There is moderate diffuse disease noted in the right coronary system.  5.     LIMA to LAD is a widely patent arterial conduit to a moderate caliber LAD which has a long stented segment in the mid into the distal vessel.  There is no significant in-stent restenosis.  Overall, there is only mild, to at most moderate, disease.  Distal to the stented segment, there is a kink in the vessel as it is quite tortuous and a moderate plaque which is also noted diffusely, but no obstructive lesions are identified.     CONCLUSIONS:  A 73-year-old with CAD who is symptomatic.  The patient has no clear \"culprit lesion\" by today's study.  The natives were patent with moderate disease and the LIMA to LAD is widely patent with no significant in-stent restenosis in the LAD.  There is, at most, moderate disease noted.  The patient will need aggressive medical management.  The patient will follow with Dr. Antelmo Plunkett for ongoing evaluation and therapy.  We only utilized 60 mL of contrast today and the patient will be gently hydrated.  Given the finding and the need for further hydration, the patient will likely be discharged tomorrow.        MDM      Patient with known coronary artery disease describes tightness in his chest.  There are some subtle EKG changes but not diagnostic of STEMI  Acute  coronary syndrome include in the differential.  Wife was concerned about the possibility of side effect from the Ozempic.  This is certainly possible but would be a diagnosis of exclusion as worsened coronary artery disease/acute coronary syndrome is a concern.  Reflux esophagitis also include the differential.    Patient treated with his evening Plavix as well as nitroglycerin  He received GI cocktail and Protonix    CBCshows normal white count with mild anemia and adequate platelets  Metabolic panel shows normal electrolytes.  Creatinine 2.42 is similar to previous  Troponin negative     Chest x-ray  I personally reviewed the actual radiographs themselves and my individual interpretation shows normal heart size and clear lungs  radiologist's formal interpretation which I have reviewed  CONCLUSION:  No active cardiopulmonary process identified.     Patient was not improving with nitroglycerin or GI cocktail.  A repeat EKG was performed:     EKG shows significant ST elevation noted in leads V1 and V2 with T wave inversions in the lateral leads  An EKG was performed. I agree with computerized EKG interval interpretations.   Ventricular rate 71 bpm. SD interval 146 ms. QRS duration 88 ms.    At this point, cardiac alert was called    I discussed case with [place] hospitalist Dr. Prieto.  He will admit  I discussed case with duly cardiology and the duly interventional cardiologist.  He will meet patient in the Cath Lab                               Medical Decision Making      Disposition and Plan     Clinical Impression:  1. Acute chest pain    2. Renal insufficiency         Disposition:  Send to or/cath/gi  7/31/2024 10:26 pm    Follow-up:  No follow-up provider specified.        Medications Prescribed:  Current Discharge Medication List             A total of 35 minutes of critical care time (exclusive of billable procedures) was administered to manage the patient's cardiovascular instability due to his acute  coronary syndrome.  This involved direct patient intervention, complex decision making, and/or extensive discussions with the patient, family, and clinical staff.

## 2024-08-01 NOTE — PLAN OF CARE
Patient arrived to unit about 0000 from cath lab. Mark is A/O x4, resting comfortably in bed overnight. R groin site C/D/I, soft, no hematoma. Tolerating PO fluids well, no nausea reported. Titrated NTG to achieve goal SBP <140. Up to chair this AM with assistance. Wife informed of plan of cares. No further concerns at this time.    Problem: Diabetes/Glucose Control  Goal: Glucose maintained within prescribed range  Description: INTERVENTIONS:  - Monitor Blood Glucose as ordered  - Assess for signs and symptoms of hyperglycemia and hypoglycemia  - Administer ordered medications to maintain glucose within target range  - Assess barriers to adequate nutritional intake and initiate nutrition consult as needed  - Instruct patient on self management of diabetes  Outcome: Progressing     Problem: Patient/Family Goals  Goal: Patient/Family Long Term Goal  Description: Patient's Long Term Goal: Remain at home and out of hospital    Interventions:  - Provide resources for family as necessary, follow plan of cares, comply with medications  - See additional Care Plan goals for specific interventions  Outcome: Progressing  Goal: Patient/Family Short Term Goal  Description: Patient's Short Term Goal: BP management    Interventions:   - NTG gtt, rest periods between cares, adjust cardiac meds if needed.  - See additional Care Plan goals for specific interventions  Outcome: Progressing     Problem: SAFETY ADULT - FALL  Goal: Free from fall injury  Description: INTERVENTIONS:  - Assess pt frequently for physical needs  - Identify cognitive and physical deficits and behaviors that affect risk of falls.  - [place] fall precautions as indicated by assessment.  - Educate pt/family on patient safety including physical limitations  - Instruct pt to call for assistance with activity based on assessment  - Modify environment to reduce risk of injury  - Provide assistive devices as appropriate  - Consider OT/PT consult to assist with  strengthening/mobility  - Encourage toileting schedule  Outcome: Progressing     Problem: DISCHARGE PLANNING  Goal: Discharge to home or other facility with appropriate resources  Description: INTERVENTIONS:  - Identify barriers to discharge w/pt and caregiver  - Include patient/family/discharge partner in discharge planning  - Arrange for needed discharge resources and transportation as appropriate  - Identify discharge learning needs (meds, wound care, etc)  - Arrange for interpreters to assist at discharge as needed  - Consider post-discharge preferences of patient/family/discharge partner  - Complete POLST form as appropriate  - Assess patient's ability to be responsible for managing their own health  - Refer to Case Management Department for coordinating discharge planning if the patient needs post-hospital services based on physician/LIP order or complex needs related to functional status, cognitive ability or social support system  Outcome: Progressing     Problem: CARDIOVASCULAR - ADULT  Goal: Maintains optimal cardiac output and hemodynamic stability  Description: INTERVENTIONS:  - Monitor vital signs, rhythm, and trends  - Monitor for bleeding, hypotension and signs of decreased cardiac output  - Evaluate effectiveness of vasoactive medications to optimize hemodynamic stability  - Monitor arterial and/or venous puncture sites for bleeding and/or hematoma  - Assess quality of pulses, skin color and temperature  - Assess for signs of decreased coronary artery perfusion - ex. Angina  - Evaluate fluid balance, assess for edema, trend weights  Outcome: Progressing  Goal: Absence of cardiac arrhythmias or at baseline  Description: INTERVENTIONS:  - Continuous cardiac monitoring, monitor vital signs, obtain 12 lead EKG if indicated  - Evaluate effectiveness of antiarrhythmic and heart rate control medications as ordered  - Initiate emergency measures for life threatening arrhythmias  - Monitor electrolytes and  administer replacement therapy as ordered  Outcome: Progressing

## 2024-08-01 NOTE — ED INITIAL ASSESSMENT (HPI)
Pt to with mid-sternal CP, feels like \"heart burn\" onset about audrey hour ago. Pt pale and diaphoretic. Hx CABG

## 2024-08-01 NOTE — PLAN OF CARE
Received patient up in chair. VSS on RA. Nitroglycerin gtt infusing, but turned off early into shift per dr. Plunkett. Home med adjusted per Dr. Plunkett as well.  Right groin site remains c/d/I, pedal pulses palpable. Patient with orders to transfer out of ICU, report called to accepting RN, patient transported via wheelchair to new room assignment via CNICU PCT with belongings in hand.

## 2024-08-01 NOTE — H&P
[facility]  History and Physical     Samy Wheeler Patient Status:  Observation    1950 MRN [id number]   [facility] 6NE-A Attending Antonella Darden MD   Hosp Day # 0 PCP Shane Stratton MD     Chief Complaint:   Chest pain    History of Present Illness: Samy Wheeler is a 73 year old male with PMH sig for DM2, CAD sp CABG, aortic valve disorder sp AVR, DL,  HTN, CKD III, depression, ANNALISE, CVA presents to the ED with chest pain.  In the ED patient complaining of chest discomfort which she rates a 7/10 which she states started 2 hours prior to arrival.  Pain radiates to the jaw.  No nausea vomiting.  Some dizziness.  No diaphoresis.  Patient takes Ozempic-started 3 weeks ago.  Took his aspirin.  No fever chills nausea vomiting.  In the ED for routine labs were checked and EKG was done which showed dynamic 1-2 mm RADHA in leads V1-V2.  Troponin negative.  Initial EKG was negative.  Patient was then taken to emergent Cath Lab.  No clear specific culprit vessel noted on coronary angiogram.  Patient was then admitted and monitored on telemetry for further care and management with cardiology following.    Past Medical History:  Past Medical History:    Aortic valve disorders    Bio AVR and CABG X 2 V.S/P CABG x 2V     Atherosclerosis of coronary artery    Back problem    Congenital anomaly of heart (HCC)    Coronary atherosclerosis    PCI with Casimiro to prox and mid RCA.  99% occlusion of mRCA had been found    Depression    Diabetes (HCC)    Goiter    bx ,     Hemiballism    ? related to severe hyperglycemia, CVA. following Neurology at [facility].    HYPERLIPIDEMIA    HYPERTENSION    KIDNEY STONE    uric acid; takes gout medication for stones    Muscle weakness    Osteoarthritis    Peyronie's disease    2009    Shortness of breath    Sleep apnea    patient wanted to trial, never did. does not use cpap.    Stroke (HCC)        Past Surgical History:   Past Surgical History:    Procedure Laterality Date    Aortic hrt valve w/card byp  2011    Back surgery      lumbar laminectomy 95    Bypass surgery  01/25/2011    CABG X 2 with LIMA to LAD, SVG to diag    Cabg      Cath percutaneous  transluminal coronary angioplasty      Colonoscopy  12/15/2011 - MARIA ELENA Win    hemorrhoids, repeat 2021.    Colonoscopy  12/15/2011    Colonoscopy,diagnostic  12/15/2011    Performed by OZIEL WIN at [facility]    Knee replacement surgery  2007    right    Knee replacement surgery  2008    left    Other surgical history      right shoulder aa/lysis of adhesions    Other surgical history  11/27/2012    left sh aa.debride rct and labrum with manip    Spine surgery procedure unlisted      Valve repair      Valve replacement  01/25/2011    23mm Cresencio-Clark Magna Ease Bovine        Social History:  reports that he has never smoked. He has never used smokeless tobacco. He reports that he does not drink alcohol and does not use drugs.    Family History:   Family History   Problem Relation Age of Onset    Heart Disorder Father     Cancer Mother         pancreatic cancer    Heart Disorder Brother         Allergies:   Allergies   Allergen Reactions    Amiodarone OTHER (SEE COMMENTS)     Extreme mentation changes    Ativan [Lorazepam] JITTERY     Intolerance, made movements worse. Tolerates klonopin    Gabapentin OTHER (SEE COMMENTS)     weakness    Haldol [Haloperidol] JITTERY     Made movements worse    Adhesive Tape RASH    Latex RASH       Medications:    Current Facility-Administered Medications on File Prior to Encounter   Medication Dose Route Frequency Provider Last Rate Last Admin    [COMPLETED] sodium chloride 0.9 % IV bolus 1,000 mL  1,000 mL Intravenous Once Monica Jacobo PA-C   Stopped at 06/01/24 1606     Current Outpatient Medications on File Prior to Encounter   Medication Sig Dispense Refill    losartan 25 MG Oral Tab Take 1 tablet (25 mg total) by mouth As Directed. Take 1  tablet (25 mg total) by mouth every other day AND 0.5 tablets (12.5 mg total) every other day. Alternate 25 and 12.5 mg dose of losartan daily      rosuvastatin 20 MG Oral Tab Take 1 tablet (20 mg total) by mouth nightly.      semaglutide 2 MG/3ML Subcutaneous Solution Pen-injector Inject into the skin once a week.      amLODIPine 5 MG Oral Tab Take 1 tablet (5 mg total) by mouth 2 (two) times daily. 180 tablet 3    ONETOUCH ULTRA In Vitro Strip USE TO TEST FIVE TIMES DAILY 500 strip 3    ALLOPURINOL 300 MG Oral Tab TAKE 1 TABLET BY MOUTH  DAILY 90 tablet 0    clopidogrel 75 MG Oral Tab Take 1 tablet (75 mg total) by mouth once daily. 90 tablet 3    valACYclovir 1 G Oral Tab Take 2 tablets (2,000 mg total) by mouth every 12 (twelve) hours as needed. 28 tablet 0    sodium bicarbonate 650 MG Oral Tab Take 1 tablet (650 mg total) by mouth 3 (three) times daily. 270 tablet 3    Acetaminophen (TYLENOL EXTRA STRENGTH OR) Take by mouth as needed.      Insulin Pen Needle (BD PEN NEEDLE SHORT U/F) 31G X 8 MM Does not apply Misc USE AS DIRECTED 6 TIMES DAILY 600 each 3    Continuous Blood Gluc Transmit (DEXCOM G6 TRANSMITTER) Does not apply Misc 1 each by Does not apply route every 3 (three) months. 1 each 3    Continuous Blood Gluc Sensor (DEXCOM G6 SENSOR) Does not apply Misc 1 each by Does not apply route Every 10 days. 9 each 3    Insulin Lispro, 1 Unit Dial, (HUMALOG KWIKPEN) 100 UNIT/ML Subcutaneous Solution Pen-injector INJECT SUBCUTANEOUSLY UP TO 30 UNITS 3 TIMES DAILY WITH MEALS 90 mL 3    Glucose Blood (ONETOUCH ULTRA BLUE) In Vitro Strip TEST 5 TIMES DAILY.E11.9,type 2 DM IDDM 500 strip 3    Albuterol Sulfate  (90 Base) MCG/ACT Inhalation Aero Soln Inhale 2 puffs into the lungs every 4 (four) hours as needed for Wheezing. 1 Inhaler 0    cetirizine 10 MG Oral Tab Take 1 tablet (10 mg total) by mouth daily as needed.      aspirin 81 MG Oral Tab Take 1 tablet (81 mg total) by mouth daily.      risperiDONE  (RISPERDAL) 0.5 MG Oral Tab Take 1 tablet (0.5 mg total) by mouth 2 (two) times daily.      melatonin 3 MG Oral Tab Take 1 tablet (3 mg total) by mouth nightly.      clonazepam (KLONOPIN) 1 MG Oral Tab Take 1 tablet (1 mg total) by mouth nightly.      TOUJEO SOLOSTAR 300 UNIT/ML Subcutaneous Solution Pen-injector INJECT 45 UNITS INTO THE SKIN NIGHTLY. (Patient taking differently: INJECT 42 UNITS INTO THE SKIN NIGHTLY.) 27 mL 1    Blood Glucose Monitoring Suppl (ONETOUCH ULTRA 2) w/Device Does not apply Kit Test 5 times daily,E11.9 type 2 DM IDDM 1 kit 0    amoxicillin 500 MG Oral Cap 4 caps one hour prior to dental procedure. (Patient not taking: Reported on 6/1/2024) 20 capsule 0    BENZONATATE OR Take by mouth as needed.      Cholecalciferol (VITAMIN D3) 125 MCG (5000 UT) Oral Tab Take 1 capsule by mouth daily. (Patient not taking: Reported on 6/1/2024)      OneTouch UltraSoft Lancets Does not apply Misc Test 5 times daily,E11.9 type 2 DM IDDM 500 each 0       Review of Systems:   A comprehensive 14 point review of systems was completed.    Pertinent positives and negatives noted in the HPI.    Physical Exam:    /67 (BP Location: Right arm)   Pulse 75   Temp 97.7 °F (36.5 °C) (Temporal)   Resp 15   Ht 6' (1.829 m)   Wt 210 lb (95.3 kg)   SpO2 91%   BMI 28.48 kg/m²   General: No acute distress. Alert and oriented x 3.  HEENT: Normocephalic atraumatic. Moist mucous membranes. EOM-I. PERRLA. Anicteric.  Neck: No lymphadenopathy. No JVD. No carotid bruits.  Respiratory: Clear to auscultation bilaterally. No wheezes. No rhonchi.  Cardiovascular: S1, S2. Regular rate and rhythm. No murmurs, rubs or gallops. Equal pulses.   Chest and Back: No tenderness or deformity.  Abdomen: Soft, nontender, nondistended.  Positive bowel sounds. No rebound, guarding or organomegaly.  Neurologic: No focal neurological deficits. CNII-XII grossly intact.  Musculoskeletal: Moves all extremities.  Extremities: No edema or  cyanosis.  Integument: No rashes or lesions.   Psychiatric: Appropriate mood and affect.      Diagnostic Data:      Labs:  Recent Labs   Lab 07/31/24 2125   WBC 8.0   HGB 12.6*   MCV 91.5   .0       Recent Labs   Lab 07/31/24 2125   GLU 81   BUN 46*   CREATSERUM 2.42*   CA 9.0   ALB 3.6      K 4.3      CO2 28.0   ALKPHO 81   AST 13*   ALT 26   BILT 0.4   TP 6.2*       Estimated Creatinine Clearance: 29.8 mL/min (A) (based on SCr of 2.42 mg/dL (H)).    No results for input(s): \"PTP\", \"INR\" in the last 168 hours.    COVID-19 Lab Results    COVID-19  Lab Results   Component Value Date    COVID19 Not Detected 09/14/2021    COVID19 Not Detected 11/14/2020    COVID19 Not Detected 10/13/2020       Pro-Calcitonin  No results for input(s): \"PCT\" in the last 168 hours.    Cardiac  No results for input(s): \"TROP\", \"PBNP\" in the last 168 hours.    Creatinine Kinase  No results for input(s): \"CK\" in the last 168 hours.    Inflammatory Markers  No results for input(s): \"CRP\", \"LOUISE\", \"LDH\", \"DDIMER\" in the last 168 hours.    Recent Labs   Lab 07/31/24 2125 08/01/24  0406   TROPHS 25 22       Imaging: Imaging data reviewed in Epic.      ASSESSMENT / PLAN:   Samy Wheeler is a 73 year old male with PMH sig for DM2, CAD sp CABG, aortic valve disorder sp AVR, DL,  HTN, CKD III, depression, ANNALISE, CVA presents to the ED with chest pain.     Chest pain 2/2 STEMI s/p coronary angiogram 7/1/24  CAD s/p CABG  -tele  -cards following   -EKG showed dynamic ST changes in leads V1-V2  -cath showed no clear culprit vessel  -supportive care    Aortic valve disorder  -sp bioprosthetic aortic valve    DM2  -no oral meds  -ISS+accuchecks    HTN  -resume home meds    DL  -statin    CKD III  -monitor renal function    ANNALISE  -cpap    Depression  -resume home meds        Quality:  DVT Prophylaxis: heparin  CODE status: full code  Haas: no  If COVID testing is negative, may discontinue isolation: yes     Plan of care discussed with  patient and all questions answered.        Antonella Darden MD  [facility]  Pager [phone]  Answering Service number: [phone]            **Certification      PHYSICIAN Certification of Need for Inpatient Hospitalization - Initial Certification    Patient will require inpatient services that will reasonably be expected to span two midnight's based on the clinical documentation in H+P.   Based on patients current state of illness, I anticipate that, after discharge, patient will require TBD.

## 2024-08-01 NOTE — SPIRITUAL CARE NOTE
Spiritual Care Visit Note    Patient Name: Samy Wheeler Date of Spiritual Care Visit: 24   : 1950 Primary Dx: Acute chest pain       Referred By:      Spiritual Care Taxonomy:    Intended Effects: Promote a sense of peace    Methods: Offer emotional support    Interventions: Assist someone with Advance Directives;Ask guided questions;Active listening    Visit Type/Summary:     - PoA: New PoAH Created:  remains available for follow up.    Spiritual Care support can be requested via an Epic consult. For urgent/immediate needs, please contact the On Call  at: [facility]: ext [phone]          Rev. Susana Clark

## 2024-08-01 NOTE — PLAN OF CARE
Patient transferred to unit at approx. 1400. VSS, SBP slightly elevated in 160s. On RA . Patient alert and oriented x 4. Right groin site dressing C/D/I, soft and without hematoma. Oriented to room, call light demonstration performed. Fall precautions in place. Patient denies any pain, chest pain, or shortness of breath. Call light within reach.

## 2024-08-02 ENCOUNTER — APPOINTMENT (OUTPATIENT)
Dept: ULTRASOUND IMAGING | Facility: HOSPITAL | Age: 74
End: 2024-08-02
Attending: INTERNAL MEDICINE
Payer: MEDICARE

## 2024-08-02 VITALS
HEART RATE: 76 BPM | BODY MASS INDEX: 28.44 KG/M2 | DIASTOLIC BLOOD PRESSURE: 70 MMHG | WEIGHT: 210 LBS | TEMPERATURE: 98 F | RESPIRATION RATE: 17 BRPM | HEIGHT: 72 IN | OXYGEN SATURATION: 92 % | SYSTOLIC BLOOD PRESSURE: 150 MMHG

## 2024-08-02 LAB
ANION GAP SERPL CALC-SCNC: 5 MMOL/L (ref 0–18)
BASOPHILS # BLD AUTO: 0.04 X10(3) UL (ref 0–0.2)
BASOPHILS NFR BLD AUTO: 0.5 %
BUN BLD-MCNC: 39 MG/DL (ref 9–23)
CALCIUM BLD-MCNC: 9.8 MG/DL (ref 8.7–10.4)
CHLORIDE SERPL-SCNC: 105 MMOL/L (ref 98–112)
CO2 SERPL-SCNC: 26 MMOL/L (ref 21–32)
CREAT BLD-MCNC: 2.2 MG/DL
EGFRCR SERPLBLD CKD-EPI 2021: 31 ML/MIN/1.73M2 (ref 60–?)
EOSINOPHIL # BLD AUTO: 0.21 X10(3) UL (ref 0–0.7)
EOSINOPHIL NFR BLD AUTO: 2.8 %
ERYTHROCYTE [DISTWIDTH] IN BLOOD BY AUTOMATED COUNT: 12.9 %
GLUCOSE BLD-MCNC: 215 MG/DL (ref 70–99)
GLUCOSE BLD-MCNC: 262 MG/DL (ref 70–99)
GLUCOSE BLD-MCNC: 374 MG/DL (ref 70–99)
HCT VFR BLD AUTO: 35.1 %
HGB BLD-MCNC: 11.9 G/DL
IMM GRANULOCYTES # BLD AUTO: 0.02 X10(3) UL (ref 0–1)
IMM GRANULOCYTES NFR BLD: 0.3 %
LYMPHOCYTES # BLD AUTO: 1.53 X10(3) UL (ref 1–4)
LYMPHOCYTES NFR BLD AUTO: 20.1 %
MCH RBC QN AUTO: 31.4 PG (ref 26–34)
MCHC RBC AUTO-ENTMCNC: 33.9 G/DL (ref 31–37)
MCV RBC AUTO: 92.6 FL
MONOCYTES # BLD AUTO: 0.63 X10(3) UL (ref 0.1–1)
MONOCYTES NFR BLD AUTO: 8.3 %
NEUTROPHILS # BLD AUTO: 5.19 X10 (3) UL (ref 1.5–7.7)
NEUTROPHILS # BLD AUTO: 5.19 X10(3) UL (ref 1.5–7.7)
NEUTROPHILS NFR BLD AUTO: 68 %
OSMOLALITY SERPL CALC.SUM OF ELEC: 300 MOSM/KG (ref 275–295)
PLATELET # BLD AUTO: 154 10(3)UL (ref 150–450)
POTASSIUM SERPL-SCNC: 4.9 MMOL/L (ref 3.5–5.1)
RBC # BLD AUTO: 3.79 X10(6)UL
SODIUM SERPL-SCNC: 136 MMOL/L (ref 136–145)
TROPONIN I SERPL HS-MCNC: 42 NG/L
WBC # BLD AUTO: 7.6 X10(3) UL (ref 4–11)

## 2024-08-02 PROCEDURE — 82962 GLUCOSE BLOOD TEST: CPT

## 2024-08-02 PROCEDURE — 85025 COMPLETE CBC W/AUTO DIFF WBC: CPT | Performed by: INTERNAL MEDICINE

## 2024-08-02 PROCEDURE — 93931 UPPER EXTREMITY STUDY: CPT | Performed by: INTERNAL MEDICINE

## 2024-08-02 PROCEDURE — 80048 BASIC METABOLIC PNL TOTAL CA: CPT | Performed by: INTERNAL MEDICINE

## 2024-08-02 PROCEDURE — 84484 ASSAY OF TROPONIN QUANT: CPT | Performed by: INTERNAL MEDICINE

## 2024-08-02 RX ORDER — METOPROLOL SUCCINATE 25 MG/1
25 TABLET, EXTENDED RELEASE ORAL
Qty: 90 TABLET | Refills: 1 | Status: SHIPPED | OUTPATIENT
Start: 2024-08-03

## 2024-08-02 RX ORDER — ISOSORBIDE MONONITRATE 30 MG/1
30 TABLET, EXTENDED RELEASE ORAL DAILY
Qty: 90 TABLET | Refills: 1 | Status: SHIPPED | OUTPATIENT
Start: 2024-08-03

## 2024-08-02 NOTE — DISCHARGE INSTRUCTIONS
HOME CARE INSTRUCTIONS FOLLOWING CORONARY ANGIOGRAPHY     Activity  DO NOT drive after the procedure.  You may resume driving late the following day according to the nurse or physician's instructions  Plan on resting and relaxing tonight and tomorrow  Resume your normal activity after 48 hours, or as instructed by your physician  Do not lift anything over 10 pounds for the next 24 hours  Avoid drinking alcohol for the next 24 hours  If the groin site was used, avoid repeated stair use and excessive walking for the next 24 hours  If the wrist was used, avoid bending/flexing of the wrist for the next 24 hours     What is Normal?  A small lump at the procedure site associated with mild tenderness when touched  The procedure site may be bruised or discolored  There may be a small amount of drainage on the bandage     Special Instructions  Drink plenty of fluids during the next 24 hours to \"flush\" the contrast from your system  The bandage is to remain in place for 24 hours  Keep the bandage clean and dry  DO NOT submerge the procedure site for 72 hours (no bath tubs or pools)  This includes dishwashing/submersion of the wrist, if the wrist was used  After 24 hours, you must remove the bandage  You should shower after removing the bandage, and wash the procedure site gently with soap and water  If you choose to wear a bandage for a few days, make sure it remains clean and dry and that it is changed daily     When you should NOTIFY YOUR PHYSICIAN  Bleeding can occur at the procedure site - both on the outside of the skin and/or beneath the surface of the skin  Swelling or a large lump at the procedure site can occur, which may be accompanied by moderate to severe pain     If either of the above occurs, lie down flat.  Have someone apply pressure to the procedure site with both hands, as instructed by the nurse.  Hold pressure for 20 minutes and the bleeding should stop.  Notify your physician of the occurrence  If the  bleeding does not stop, call 911 and continue to apply pressure     If you experience signs of a fever, temperature > 101°, chills, infection (redness, swelling, thick yellow drainage, or a foul odor from the procedure site)  If you notice any numbness, tingling, or loss of feeling to your leg or foot or groin access  If you notice any numbness, tingling, or loss of feeling to your fingers or hand, if wrist access was utilized     Other  You may resume your present diet, unless otherwise specified by your physician.  You may resume all of your medications as prescribed, unless otherwise directed by your physician.  A list of your medications was provided to you at discharge.  Continue the walking program initiated in the hospital and progress your walking as directed.  Or, gradually resume your previous aerobic exercise schedule as tolerated.  Please call your physician’s office for a follow-up appointment.  You should be seen in 2 weeks.

## 2024-08-02 NOTE — PROGRESS NOTES
[facility]   part of [facility] Hospitalist Progress Note     Samy Wheeler Patient Status:  Observation    1950 MRN [id number]   Location [facility] 2NE-A Attending Antonella Darden MD   Hosp Day # 0 PCP Shane Stratton MD     Chief Complaint:   FU: Chest pain    Subjective:     Patient seen and examined.   No chest pain, no sob  No dizziness, no nausea, no vomiting  Afebrile      Objective:    Review of Systems:   10 point ROS completed and was negative, except for pertinent positive and negatives stated in subjective.    Vital signs:  Temp:  [97.3 °F (36.3 °C)-98.3 °F (36.8 °C)] 98.2 °F (36.8 °C)  Pulse:  [66-82] 73  Resp:  [14-25] 16  BP: (133-165)/(64-78) 140/71  SpO2:  [91 %-100 %] 95 %    Physical Exam:    General: No acute distress.   HEENT:  EOMI, PERRLA, OP clear  Respiratory: Clear to auscultation bilaterally. No wheezes. No rhonchi.  Cardiovascular: S1, S2. Regular rate and rhythm. No murmurs.  Abdomen: Soft, nontender, nondistended.  Positive bowel sounds. No rebound or guarding.  Extremities: No edema.  Neuro:  Grossly non focal, no motor deficits.        Diagnostic Data:    Labs:  Recent Labs   Lab 24  1140   WBC 8.0 6.8   HGB 12.6* 12.2*   MCV 91.5 92.5   .0 144.0*       Recent Labs   Lab 24  1140   GLU 81 265*   BUN 46* 28*   CREATSERUM 2.42* 2.02*   CA 9.0 9.1   ALB 3.6  --     135*   K 4.3 4.7    107   CO2 28.0 23.0   ALKPHO 81  --    AST 13*  --    ALT 26  --    BILT 0.4  --    TP 6.2*  --        Estimated Creatinine Clearance: 35.7 mL/min (A) (based on SCr of 2.02 mg/dL (H)).    No results for input(s): \"PTP\", \"INR\" in the last 168 hours.         COVID-19 Lab Results    COVID-19  Lab Results   Component Value Date    COVID19 Not Detected 2021    COVID19 Not Detected 2020    COVID19 Not Detected 10/13/2020       Pro-Calcitonin  No results for input(s): \"PCT\" in the last 168  hours.    Cardiac  No results for input(s): \"TROP\", \"PBNP\" in the last 168 hours.    Creatinine Kinase  Recent Labs   Lab 08/01/24  1056   *       Inflammatory Markers  No results for input(s): \"CRP\", \"LOUISE\", \"LDH\", \"DDIMER\" in the last 168 hours.    Imaging: Imaging data reviewed in Epic.    Medications:    allopurinol  300 mg Oral Daily    amLODIPine  5 mg Oral BID    aspirin  81 mg Oral Daily    clonazePAM  1 mg Oral Nightly    clopidogrel  75 mg Oral Daily    risperiDONE  0.5 mg Oral BID    rosuvastatin  20 mg Oral Nightly    sodium bicarbonate  650 mg Oral TID    insulin degludec  42 Units Subcutaneous Nightly    insulin aspart  2-10 Units Subcutaneous TID AC and HS    metoprolol succinate ER  25 mg Oral Daily Beta Blocker    isosorbide mononitrate ER  30 mg Oral Daily    heparin  5,000 Units Subcutaneous 2 times per day    docusate sodium  100 mg Oral BID    Lidocaine Viscous HCl  10 mL Mouth/Throat Once       Assessment & Plan:    Samy Wheeler is a 73 year old male with PMH sig for DM2, CAD sp CABG, aortic valve disorder sp AVR, DL,  HTN, CKD III, depression, ANNALISE, CVA presents to the ED with chest pain.      Chest pain 2/2 STEMI s/p coronary angiogram 7/1/24  CAD s/p CABG  -tele  -cards following >> apprec recs   -EKG showed dynamic ST changes in leads V1-V2  -cath showed no clear culprit vessel  -medical management: DAPT, imdur, BB, statin, norvasc  -supportive care  -cards ordered L subclavian US >> can get inpt or outpt     Aortic valve disorder  -sp bioprosthetic aortic valve     DM2  -no oral meds  -ISS+accuchecks     HTN  -resume home meds     DL  -statin    CKD III  -monitor renal function  -creatinine appears close to baseline  -repeat labs in the morning     ANNALISE  -cpap     Depression  -resume home meds           Quality:  DVT Prophylaxis: heparin  CODE status: full code  Haas: no  If COVID testing is negative, may discontinue isolation: yes      DISPO:  Dc planning in process  Cards  following  Likely dc home today    Plan of care discussed with patient and all questions answered.           Antonella Darden MD  [facility]  Pager [phone]  Answering Service number: [phone]

## 2024-08-02 NOTE — PLAN OF CARE
Pt received at 1930. A&Ox4. Tolerating Room Air, breathing without difficulty. NSR on tele monitor, Bps improving with scheduled medications. Right groin dressing in place remains clean/dry/soft. Continent, pt c/o constipation last bm 7/30, hospitalist notified and bowel meds administered per orders. Denies pain. Ambulating with standby assist. Left arm CGM site is clean/dry/intact. Pt updated and agrees with plan of care. Left arm arterial ultrasound ordered      Problem: Patient/Family Goals  Goal: Patient/Family Long Term Goal  Description: Patient's Long Term Goal: Remain at home and out of hospital    Interventions:  - Provide resources for family as necessary, follow plan of cares, comply with medications  - See additional Care Plan goals for specific interventions  Outcome: Progressing  Goal: Patient/Family Short Term Goal  Description: Patient's Short Term Goal: BP management    Interventions:   - NTG gtt, rest periods between cares, adjust cardiac meds if needed.  - See additional Care Plan goals for specific interventions  Outcome: Progressing

## 2024-08-02 NOTE — CM/SW NOTE
Pt discussed in rounds and chart was reviewed. No anticipated DC needs identified at this time. Will monitor for any changes in needs.      to remain available for support and/or discharge planning.    MANISH Hoffman  Discharge Planner  [phone]

## 2024-08-02 NOTE — PLAN OF CARE
Patient alert and oriented x 4. On RA. Up with SBA. NSR on tele. Continent of bowel/bladder. No complaints of pain, shortness of breath, chest pain/discomfort. POC: US subclavian artery, discharge pending US result. Call light within reach. Fall precautions in place.     Problem: Diabetes/Glucose Control  Goal: Glucose maintained within prescribed range  Description: INTERVENTIONS:  - Monitor Blood Glucose as ordered  - Assess for signs and symptoms of hyperglycemia and hypoglycemia  - Administer ordered medications to maintain glucose within target range  - Assess barriers to adequate nutritional intake and initiate nutrition consult as needed  - Instruct patient on self management of diabetes  Outcome: Progressing     Problem: Patient/Family Goals  Goal: Patient/Family Long Term Goal  Description: Patient's Long Term Goal: Remain at home and out of hospital    Interventions:  - Provide resources for family as necessary, follow plan of cares, comply with medications  - See additional Care Plan goals for specific interventions  Outcome: Progressing  Goal: Patient/Family Short Term Goal  Description: Patient's Short Term Goal: BP management    Interventions:   - NTG gtt, rest periods between cares, adjust cardiac meds if needed.  - See additional Care Plan goals for specific interventions  Outcome: Progressing     Problem: SAFETY ADULT - FALL  Goal: Free from fall injury  Description: INTERVENTIONS:  - Assess pt frequently for physical needs  - Identify cognitive and physical deficits and behaviors that affect risk of falls.  - Dumas fall precautions as indicated by assessment.  - Educate pt/family on patient safety including physical limitations  - Instruct pt to call for assistance with activity based on assessment  - Modify environment to reduce risk of injury  - Provide assistive devices as appropriate  - Consider OT/PT consult to assist with strengthening/mobility  - Encourage toileting schedule  Outcome:  Progressing     Problem: DISCHARGE PLANNING  Goal: Discharge to home or other facility with appropriate resources  Description: INTERVENTIONS:  - Identify barriers to discharge w/pt and caregiver  - Include patient/family/discharge partner in discharge planning  - Arrange for needed discharge resources and transportation as appropriate  - Identify discharge learning needs (meds, wound care, etc)  - Arrange for interpreters to assist at discharge as needed  - Consider post-discharge preferences of patient/family/discharge partner  - Complete POLST form as appropriate  - Assess patient's ability to be responsible for managing their own health  - Refer to Case Management Department for coordinating discharge planning if the patient needs post-hospital services based on physician/LIP order or complex needs related to functional status, cognitive ability or social support system  Outcome: Progressing     Problem: CARDIOVASCULAR - ADULT  Goal: Maintains optimal cardiac output and hemodynamic stability  Description: INTERVENTIONS:  - Monitor vital signs, rhythm, and trends  - Monitor for bleeding, hypotension and signs of decreased cardiac output  - Evaluate effectiveness of vasoactive medications to optimize hemodynamic stability  - Monitor arterial and/or venous puncture sites for bleeding and/or hematoma  - Assess quality of pulses, skin color and temperature  - Assess for signs of decreased coronary artery perfusion - ex. Angina  - Evaluate fluid balance, assess for edema, trend weights  Outcome: Progressing  Goal: Absence of cardiac arrhythmias or at baseline  Description: INTERVENTIONS:  - Continuous cardiac monitoring, monitor vital signs, obtain 12 lead EKG if indicated  - Evaluate effectiveness of antiarrhythmic and heart rate control medications as ordered  - Initiate emergency measures for life threatening arrhythmias  - Monitor electrolytes and administer replacement therapy as ordered  Outcome: Progressing

## 2024-08-02 NOTE — PROGRESS NOTES
[facility]  Progress Note    Samy Wheeler Patient Status:  Observation    1950 MRN [id number]   Location [facility] 6NE-A Attending Antonella Darden MD   Hosp Day # 0 PCP Shane Stratton MD     Subjective:   No further chest pain.  No shortness of breath.  No focal cardiovascular complaints reported.  No complaints at R femoral cath site.    Objective:  Vitals:    24 2035 24 0014 24 0409 24 0824   BP: 133/78 143/75 140/71 150/70   BP Location: Right arm Right arm Right arm Right arm   Pulse: 81 82 73 76   Resp: 18 18 16 17   Temp: 97.9 °F (36.6 °C) 98.3 °F (36.8 °C) 98.2 °F (36.8 °C) 98.1 °F (36.7 °C)   TempSrc: Oral Oral Oral Oral   SpO2: 96% 93% 95% 92%   Weight:       Height:           Temp (24hrs), Av.9 °F (36.6 °C), Min:97.3 °F (36.3 °C), Max:98.3 °F (36.8 °C)      Medications:   Scheduled:    allopurinol  300 mg Oral Daily    amLODIPine  5 mg Oral BID    aspirin  81 mg Oral Daily    clonazePAM  1 mg Oral Nightly    clopidogrel  75 mg Oral Daily    risperiDONE  0.5 mg Oral BID    rosuvastatin  20 mg Oral Nightly    sodium bicarbonate  650 mg Oral TID    insulin degludec  42 Units Subcutaneous Nightly    insulin aspart  2-10 Units Subcutaneous TID AC and HS    metoprolol succinate ER  25 mg Oral Daily Beta Blocker    isosorbide mononitrate ER  30 mg Oral Daily    heparin  5,000 Units Subcutaneous 2 times per day    docusate sodium  100 mg Oral BID    Lidocaine Viscous HCl  10 mL Mouth/Throat Once       Continuous Infusion:         PRN Medications:     albuterol    cetirizine    glucose **OR** glucose **OR** glucose-vitamin C **OR** dextrose **OR** glucose **OR** glucose **OR** glucose-vitamin C    acetaminophen    ondansetron    polyethylene glycol (PEG 3350)    bisacodyl    nitroglycerin    morphINE    Intake/Output:     Intake/Output Summary (Last 24 hours) at 2024 0901  Last data filed at 2024 0824  Gross per 24 hour   Intake 360 ml   Output 700 ml    Net -340 ml       Wt Readings from Last 3 Encounters:   07/31/24 210 lb (95.3 kg)   10/09/23 207 lb 0.2 oz (93.9 kg)   02/22/22 208 lb (94.3 kg)       Allergies:  Allergies   Allergen Reactions    Amiodarone OTHER (SEE COMMENTS)     Extreme mentation changes    Ativan [Lorazepam] JITTERY     Intolerance, made movements worse. Tolerates klonopin    Gabapentin OTHER (SEE COMMENTS)     weakness    Haldol [Haloperidol] JITTERY     Made movements worse    Adhesive Tape RASH    Latex RASH       Physical Exam:   General:  Well-developed / Well-nourished.  No acute distress.  HEENT:  Normocephalic.  Atraumatic.  No icterus.  Neck:  There is no jugular venous distention.   Cardiovascular:  Cardiovascular examination demonstrates a regular rate and rhythm.  There is normal S1, S2.  There is no S3 or S4.  There are no rubs or gallops.  Grade 2/6 LUIZ LSB.  No click is appreciated.  PMI is nondisplaced with a normal apical impulse.    Pulmonary:  Lungs are clear to auscultation bilaterally.  There are no focal rales, rhonchi, or wheezes.  Good air movement is noted throughout both lung fields.   Abdomen:  The abdomen is soft, non-distended, and non-tender.  Bowel sounds are present and normoactive.  No organomegaly is appreciated.  Extremities:  Extremities do not demonstrate any evidence of peripheral edema.   No cyanosis or clubbing of the digits is appreciated.  Neurologic:  Alert and oriented.  Normal affect.  Integument:  No visible rashes are appreciated.      Laboratory/Data:   Recent Labs   Lab 07/31/24 2125 08/01/24  1140   GLU 81 265*   BUN 46* 28*   CREATSERUM 2.42* 2.02*   CA 9.0 9.1   ALB 3.6  --     135*   K 4.3 4.7    107   CO2 28.0 23.0   ALKPHO 81  --    AST 13*  --    ALT 26  --    BILT 0.4  --    TP 6.2*  --        Recent Labs   Lab 07/31/24 2125 08/01/24  1140   RBC 3.98 3.86   HGB 12.6* 12.2*   HCT 36.4* 35.7*   MCV 91.5 92.5   MCH 31.7 31.6   MCHC 34.6 34.2   RDW 13.1 13.0   NEPRELIM 4.77  4.51   WBC 8.0 6.8   .0 144.0*       No results for input(s): \"PTP\", \"INR\" in the last 168 hours.    Recent Labs   Lab 08/01/24  1056   *         Tele:     Diagnostics:    Cath:   Findings:  1. Left main: No stenosis   2. LAD: Ostial .   3. LCX: High OM1/ramus with moderate diffuse proximal plaquing ~ 60-70%. There is a tiny branch off this that is subtotally occluded which is unchanged. The LCX proper then supplies a small OM2 and a medium sized posterolateral branch, with mild plaque < 30%.   4. RCA: Anterior takeoff (engage with AL1). Dominant to the PDA. Proximal stents widely patent with no significant ISR. Rest of vessel to PDA is smooth without stenosis. RPL smaller with mild <50% plaque.  5. SVG to diagonal known occlusion, not studied.  6. LIMA to mid LAD: very tortuous, widely patent, touches down mid LAD. Downstream long LAD stents are widely patent without significant ISR. Stents span a distal diagonal branch that is small but patent without stenosis. Proximal to the anastomosis, the LAD has a short segment of ~ 90% stenosis which backfills into a small diagonal branch and a septal . This branch is < 2 mm in diameter. The SVG backfills to a stump in the mid portion of the diagonal.    7. Right CFA: Non obstructive plaque. S/p Perclose Proglide.     Conclusions / Recommendations:  There is MARCOS 3 flow in all major territories. The closest lesion to a culprit would be the proximal LAD, upstream from the LIMA anastomosis, which backfills a small diagonal and septal . It is feasible to treat the upstream LAD via the LIMA, however it is very tortuous and there is risk for compromising the LIMA. It is also feasible to treat the LAD  antegrade via the left main, though this would be a complex undertaking, likely requiring rotational atherectomy of the LAD, stenting into the left main and would risk compromising the large LCX and ramus systems. Another consideration is  the patient's CKD 4 with Cr ~ 2.5. As the patient is currently completely CP free, without ST segment elevations seen, MARCOS 3 flow, and hemodynamically and electrically stable, I did not think proceeding with PCI would be in his best interest.   We will keep him on nitroglycerin and beta blockers overnight and medically manage his symptoms. Consider heparin if R groin is stable in the morning, particularly if his troponin rises or if he has more symptoms.   Continue DAPT, statin  Because of tortuosity in the left subclavian, I did not perform an angiogram as the catheter tip was always up against the wall. There was not a significant pressure gradient when removing the REYNALDO catheter into the aorta, but could consider a CTA or ultrasound to evaluate for L subclavian stenosis.     Assessment:    1.  Acute chest pain  -ECG with dynamic ST changes  -CAD as above - no clear specific \"culprit\"vessel  -Medical Rx  -Negative CV isoenzymes despite hours of Sx and ECG changes  2.  S/P Bioprosthetic Aortic valve replacement 1/11.   3.  S/P CABG x 2V 1/11.  4.  CAD                 -S/P GRANT to RCA 5/7/18                -S/P GRANT to LAD 9/21                -Stable anatomy 10/23 after abn Stress 9/23 - false +    -Relatively stable anatomy 7/24 cath, as above  5.  Hypertension   6.  Hyperlipidemia - on statin.    7.  Diabetes mellitus.  8.  Questionable family history of premature vascular disease.    9.  Transient post-op AFib - no documented recurrence.  No recurrence documented on LINQ to date.  LINQ removed.  10.  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  ALVA without focal source.  11.  Depression.  12.  Hemiballismus - Hemichorea syndrome  13.  PFO on ALVA  14.  VT on past LINQ interrogation with NL EF and negative cath thereafter.  15.  CRI - stable      Plan:    DAPT   Imdur   BB   Statin   Norvasc   L subclavian US -home later today if unremarkable   Medical Rx for current anatomy at this time.       Antelmo Plunkett MD  8/2/2024

## 2025-01-22 NOTE — ED QUICK NOTES
Patient reports feeling \"better\" and less shaky after eating sandwich and drinking second oral glucose. Repeat sugar 151. MD made aware. no

## (undated) DEVICE — ANGLED-TIP ARTERIAL SHEATH CONFIGURATION: Brand: ENROUTE TRANSCAROTID NEUROPROTECTION SYSTEM

## (undated) DEVICE — GUIDE WIRE TCAR ENROUTE

## (undated) DEVICE — STERILE POLYISOPRENE POWDER-FREE SURGICAL GLOVES: Brand: PROTEXIS

## (undated) DEVICE — SUTURE MONOCRYL 4-0 PS-2

## (undated) DEVICE — SUTURE VICRYL 3-0 SH

## (undated) DEVICE — CHLORAPREP 26ML APPLICATOR

## (undated) DEVICE — INTRODUCER TCAR 21G/5F

## (undated) DEVICE — SUTURE SILK 3-0

## (undated) DEVICE — SOL  .9 1000ML BTL

## (undated) DEVICE — 3M™ IOBAN™ 2 ANTIMICROBIAL INCISE DRAPE 6650EZ: Brand: IOBAN™ 2

## (undated) DEVICE — STANDARD HYPODERMIC NEEDLE,POLYPROPYLENE HUB: Brand: MONOJECT

## (undated) DEVICE — SUTURE SILK 3-0 SH

## (undated) DEVICE — SUTURE PROLENE 5-0 C-1

## (undated) DEVICE — CV PACK-LF: Brand: MEDLINE INDUSTRIES, INC.

## (undated) DEVICE — CAUTERY PENCIL

## (undated) DEVICE — SKIN AFFIX .4ML

## (undated) DEVICE — SUTURE VICRYL 3-0 PS-1

## (undated) DEVICE — TRANSPOSAL ULTRAFLEX DUO/QUAD ULTRA CART MANIFOLD

## (undated) NOTE — LETTER
Josephine Nicholson 182  [address], [address] [facility]  Authorization for Surgical Operation and Procedure     Date:___________                                                                                                         Time:__________ 4.   Should the need arise during my operation or immediate post-operative period, I also consent to the administration of blood and/or blood products.   Further, I understand that despite careful testing and screening of blood or blood products by soren 8.   I recognize that in the event my procedure results in extended X-Ray/fluoroscopy time, I may develop a skin reaction. 9.  If I have a Do Not Attempt Resuscitation (DNAR) order in place, that status will be suspended while in the operating room, proc 1. IRaj agree to be cared for by an anesthesiologist, who is specially trained to monitor me and give me medicine to put me to sleep or keep me comfortable during my procedure    I understand that my anesthesiologist is not an employee or age 5. My doctor has explained to me other choices available to me for my care (alternatives).   6. Pregnant Patients (“epidural”):  I understand that the risks of having an epidural (medicine given into my back to help control pain during labor), include itchi

## (undated) NOTE — LETTER
October 11, 2023       [address]  [place] [id number]       To Whom It May Concern:    Mesha Gill has been under our care regarding ongoing medical issues and was evaluated during his hospital stay at [facility] 10/9/2023-10/11/2023. He may resume cardiac rehab without limitations. Please feel free to contact us if there are any questions.       Sincerely,      MD Daquan Haleshreya Feng [address]  On license of [facility] [phone]      Document generated by:  Mary Candelaria NP

## (undated) NOTE — LETTER
[facility] [address], [address]  Consent for Procedure/Sedation    Date:        Time:       1. I authorize the performance upon Lexus Chin the following:  Loop Recorder Device Removal     2.  I authorize Dr. Yanely Sterling (and wh ________________________________    ___________________    Witness: _________________________      Date: ___________________    Printed: 3/21/2019   1:27 PM  Patient Name: Yaneth Hu        : 1950       Medical Record #: [id number]

## (undated) NOTE — LETTER
[facility] [address], [address]  Consent for Procedure/Sedation    Date: 5/6/18    Time: 2137      1.  I authorize the performance upon Yris Cabrera the following:cardiac catheterization, left ventricular cineangiography, bila period, the physician will determine when the applicable recovery period ends for purposes of reinstating the Do Not Resuscitate (DNR) order.     Signature of Patient: ____________________________________________________    Signature of person authorized

## (undated) NOTE — LETTER
[address] [facility] FOR BLOOD OR BLOOD COMPONENTS      In the course of your treatment, it may become necessary to administer a transfusion of blood or blood components.  This form provides basic information concerning this proc If loss of blood poses serious threats in the course of your treatment, THERE IS NO EFFECTIVE ALTERNATIVE TO BLOOD TRANSFUSION.  However, if you have any further questions on this matter, your physician will fully explain the alternatives to you if it has n

## (undated) NOTE — LETTER
[address]  [address]  Consent for Procedure/Sedation  Date: 07/31/2024         Time: 1200 am    I hereby authorize Dr. Calderon, my physician and his/her assistants (if applicable), which may include medical students, residents, and/or fellows, to perform the following surgical operation/ procedure and administer such anesthesia as may be determined necessary by my physician:  Operation/Procedure name (s)  Cardiac Catheterization, Left Ventricular Cineangiography, Bilateral Selective Coronary Angiography and/or Right Heart Catheterization; possible Percutaneous Transluminal Coronary Angioplasty, Coronary Atherectomy, Coronary Stent, Intracoronary Thrombolytic therapy, Antiplatelet therapy and/or Intravascular Ultrasound on Samy ARACELI Wheeler   2.   I recognize that during the surgical operation/procedure, unforeseen conditions may necessitate additional or different procedures than those listed above.  I, therefore, further authorize and request that the above-named surgeon, assistants, or designees perform such procedures as are, in their judgment, necessary and desirable.    3.   My surgeon/physician has discussed prior to my surgery the potential benefits, risks and side effects of this procedure; the likelihood of achieving goals; and potential problems that might occur during recuperation.  They also discussed reasonable alternatives to the procedure, including risks, benefits, and side effects related to the alternatives and risks related to not receiving this procedure.  I have had all my questions answered and I acknowledge that no guarantee has been made as to the result that may be obtained.    4.   Should the need arise during my operation/procedure, which includes change of level of care prior to discharge, I also consent to the administration of blood and/or blood products.  Further, I understand that despite careful testing and screening of blood or blood products by  collecting agencies, I may still be subject to ill effects as a result of receiving a blood transfusion and/or blood products.  The following are some, but not all, of the potential risks that can occur: fever and allergic reactions, hemolytic reactions, transmission of diseases such as Hepatitis, AIDS and Cytomegalovirus (CMV) and fluid overload.  In the event that I wish to have an autologous transfusion of my own blood, or a directed donor transfusion, I will discuss this with my physician.  Check only if Refusing Blood or Blood Products  I understand refusal of blood or blood products as deemed necessary by my physician may have serious consequences to my condition to include possible death. I hereby assume responsibility for my refusal and release the hospital, its personnel, and my physicians from any responsibility for the consequences of my refusal.          o  Refuse      5.   I authorize the use of any specimen, organs, tissues, body parts or foreign objects that may be removed from my body during the operation/procedure for diagnosis, research or teaching purposes and their subsequent disposal by hospital authorities.  I also authorize the release of specimen test results and/or written reports to my treating physician on the hospital medical staff or other referring or consulting physicians involved in my care, at the discretion of the Pathologist or my treating physician.    6.   I consent to the photographing or videotaping of the operations or procedures to be performed, including appropriate portions of my body for medical, scientific, or educational purposes, provided my identity is not revealed by the pictures or by descriptive texts accompanying them.  If the procedure has been photographed/videotaped, the surgeon will obtain the original picture, image, videotape or CD.  The hospital will not be responsible for storage, release or maintenance of the picture, image, tape or CD.    7.   I consent  to the presence of a  or observers in the operating room as deemed necessary by my physician or their designees.    8.   I recognize that in the event my procedure results in extended X-Ray/fluoroscopy time, I may develop a skin reaction.    9. If I have a Do Not Attempt Resuscitation (DNAR) order in place, that status will be suspended while in the operating room, procedural suite, and during the recovery period unless otherwise explicitly stated by me (or a person authorized to consent on my behalf). The surgeon or my attending physician will determine when the applicable recovery period ends for purposes of reinstating the DNAR order.  10. Patients having a sterilization procedure: I understand that if the procedure is successful the results will be permanent and it will therefore be impossible for me to inseminate, conceive, or bear children.  I also understand that the procedure is intended to result in sterility, although the result has not been guaranteed.   11. I acknowledge that my physician has explained sedation/analgesia administration to me including the risk and benefits I consent to the administration of sedation/analgesia as may be necessary or desirable in the judgment of my physician.    I CERTIFY THAT I HAVE READ AND FULLY UNDERSTAND THE ABOVE CONSENT TO OPERATION and/or OTHER PROCEDURE.      ____________________________________       _________________________________      ______________________________  Signature of Patient         Signature of Responsible Person        Printed Name of Responsible Person   ____________________________________      _________________________________      ______________________________       Signature of Witness          Relationship to Patient                       Date                                       Time  Patient Name: Samy Wheeler  : 1950    Reviewed: 2024   Printed: 2024  Medical Record #: [id number] Page 1 of 1

## (undated) NOTE — LETTER
[facility] [address], [address]  Consent for Procedure/Sedation    Date:     Time:       1.  I authorize the performance upon Gertrudis Fernandez the following:cardiac catheterization, left ventricular cineangiography, bilateral kushal period, the physician will determine when the applicable recovery period ends for purposes of reinstating the Do Not Resuscitate (DNR) order.     Signature of Patient: ____________________________________________________    Signature of person authorized

## (undated) NOTE — LETTER
[facility] [address], [address] [place] [address]  Consent for Procedure/Sedation  Date: 10/10/23         Time: 0600    I hereby authorize Dr. Milagros Gates , my physician and his/her assistants (if applicable), which may include medical students, residents, and/or fellows, to perform the following surgical operation/ procedure and administer such anesthesia as may be determined necessary by my physician:  Operation/Procedure name (s)  Cardiac Catheterization, Left Ventricular Cineangiography, Bilateral Selective Coronary Angiography and/or Right Heart Catheterization; possible Percutaneous Transluminal Coronary Angioplasty, Coronary Atherectomy, Coronary Stent, Intracoronary Thrombolytic therapy, Antiplatelet therapy and/or Intravascular Ultrasound on Arvella Fell   2. I recognize that during the surgical operation/procedure, unforeseen conditions may necessitate additional or different procedures than those listed above. I, therefore, further authorize and request that the above-named surgeon, assistants, or designees perform such procedures as are, in their judgment, necessary and desirable. 3.   My surgeon/physician has discussed prior to my surgery the potential benefits, risks and side effects of this procedure; the likelihood of achieving goals; and potential problems that might occur during recuperation. They also discussed reasonable alternatives to the procedure, including risks, benefits, and side effects related to the alternatives and risks related to not receiving this procedure. I have had all my questions answered and I acknowledge that no guarantee has been made as to the result that may be obtained. 4.   Should the need arise during my operation/procedure, which includes change of level of care prior to discharge, I also consent to the administration of blood and/or blood products.   Further, I understand that despite careful testing and screening of blood or blood products by collecting agencies, I may still be subject to ill effects as a result of receiving a blood transfusion and/or blood products. The following are some, but not all, of the potential risks that can occur: fever and allergic reactions, hemolytic reactions, transmission of diseases such as Hepatitis, AIDS and Cytomegalovirus (CMV) and fluid overload. In the event that I wish to have an autologous transfusion of my own blood, or a directed donor transfusion, I will discuss this with my physician. Check only if Refusing Blood or Blood Products  I understand refusal of blood or blood products as deemed necessary by my physician may have serious consequences to my condition to include possible death. I hereby assume responsibility for my refusal and release the hospital, its personnel, and my physicians from any responsibility for the consequences of my refusal.          o  Refuse      5. I authorize the use of any specimen, organs, tissues, body parts or foreign objects that may be removed from my body during the operation/procedure for diagnosis, research or teaching purposes and their subsequent disposal by hospital authorities. I also authorize the release of specimen test results and/or written reports to my treating physician on the hospital medical staff or other referring or consulting physicians involved in my care, at the discretion of the Pathologist or my treating physician. 6.   I consent to the photographing or videotaping of the operations or procedures to be performed, including appropriate portions of my body for medical, scientific, or educational purposes, provided my identity is not revealed by the pictures or by descriptive texts accompanying them. If the procedure has been photographed/videotaped, the surgeon will obtain the original picture, image, videotape or CD.   The hospital will not be responsible for storage, release or maintenance of the picture, image, tape or CD.    7.   I consent to the presence of a  or observers in the operating room as deemed necessary by my physician or their designees. 8.   I recognize that in the event my procedure results in extended X-Ray/fluoroscopy time, I may develop a skin reaction. 9. If I have a Do Not Attempt Resuscitation (DNAR) order in place, that status will be suspended while in the operating room, procedural suite, and during the recovery period unless otherwise explicitly stated by me (or a person authorized to consent on my behalf). The surgeon or my attending physician will determine when the applicable recovery period ends for purposes of reinstating the DNAR order. 10. Patients having a sterilization procedure: I understand that if the procedure is successful the results will be permanent and it will therefore be impossible for me to inseminate, conceive, or bear children. I also understand that the procedure is intended to result in sterility, although the result has not been guaranteed. 11. I acknowledge that my physician has explained sedation/analgesia administration to me including the risk and benefits I consent to the administration of sedation/analgesia as may be necessary or desirable in the judgment of my physician.     I CERTIFY THAT I HAVE READ AND FULLY UNDERSTAND THE ABOVE CONSENT TO OPERATION and/or OTHER PROCEDURE.        ____________________________________       _________________________________      ______________________________  Signature of Patient         Signature of Responsible Person        Printed Name of Responsible Person    ____________________________________      _________________________________      ______________________________       Signature of Witness          Relationship to Patient                       Date                                       Time  Patient Name: Iam Durán     : 1950                 Printed: 2023      Medical Record #: [id number] Page 1 of 2

## (undated) NOTE — LETTER
[facility] [address], [address]  Consent for Procedure/Sedation    Date: 9/15/2021    Time: 0700      1.  I authorize the performance upon [employer] the following:cardiac catheterization, left ventricular cineangiography, b ____________________________________________________    Signature of person authorized                                     Relationship to  to consent for patient: _________________________ patient: ___________________    Witness: _________________________